# Patient Record
Sex: MALE | Race: WHITE | NOT HISPANIC OR LATINO | Employment: OTHER | URBAN - METROPOLITAN AREA
[De-identification: names, ages, dates, MRNs, and addresses within clinical notes are randomized per-mention and may not be internally consistent; named-entity substitution may affect disease eponyms.]

---

## 2017-08-16 ENCOUNTER — APPOINTMENT (OUTPATIENT)
Dept: LAB | Facility: MEDICAL CENTER | Age: 77
End: 2017-08-16

## 2017-08-16 ENCOUNTER — HOSPITAL ENCOUNTER (OUTPATIENT)
Dept: LAB | Facility: MEDICAL CENTER | Age: 77
End: 2017-08-16
Attending: INTERNAL MEDICINE
Payer: COMMERCIAL

## 2017-08-17 ENCOUNTER — HOSPITAL ENCOUNTER (OUTPATIENT)
Dept: HOSPITAL 8 - LAB | Age: 77
Discharge: HOME | End: 2017-08-17
Attending: INTERNAL MEDICINE
Payer: COMMERCIAL

## 2017-08-17 DIAGNOSIS — I25.10: ICD-10-CM

## 2017-08-17 DIAGNOSIS — E78.4: Primary | ICD-10-CM

## 2017-08-17 DIAGNOSIS — E11.9: ICD-10-CM

## 2017-08-17 LAB
AST SERPL-CCNC: 20 U/L (ref 15–37)
BUN SERPL-MCNC: 18 MG/DL (ref 7–18)

## 2017-08-17 PROCEDURE — 36415 COLL VENOUS BLD VENIPUNCTURE: CPT

## 2017-08-17 PROCEDURE — 80061 LIPID PANEL: CPT

## 2017-08-17 PROCEDURE — 83036 HEMOGLOBIN GLYCOSYLATED A1C: CPT

## 2017-08-17 PROCEDURE — 80053 COMPREHEN METABOLIC PANEL: CPT

## 2017-12-01 ENCOUNTER — HOSPITAL ENCOUNTER (OUTPATIENT)
Dept: LAB | Facility: MEDICAL CENTER | Age: 77
End: 2017-12-01
Attending: INTERNAL MEDICINE
Payer: COMMERCIAL

## 2017-12-01 LAB
ALBUMIN SERPL BCP-MCNC: 4.4 G/DL (ref 3.2–4.9)
ALP SERPL-CCNC: 53 U/L (ref 30–99)
ALT SERPL-CCNC: 21 U/L (ref 2–50)
AST SERPL-CCNC: 22 U/L (ref 12–45)
BILIRUB CONJ SERPL-MCNC: 0.1 MG/DL (ref 0.1–0.5)
BILIRUB INDIRECT SERPL-MCNC: 0.7 MG/DL (ref 0–1)
BILIRUB SERPL-MCNC: 0.8 MG/DL (ref 0.1–1.5)
CHOLEST SERPL-MCNC: 180 MG/DL (ref 100–199)
HDLC SERPL-MCNC: 34 MG/DL
LDLC SERPL CALC-MCNC: 76 MG/DL
PROT SERPL-MCNC: 7.2 G/DL (ref 6–8.2)
TRIGL SERPL-MCNC: 351 MG/DL (ref 0–149)

## 2017-12-01 PROCEDURE — 36415 COLL VENOUS BLD VENIPUNCTURE: CPT

## 2017-12-01 PROCEDURE — 80061 LIPID PANEL: CPT

## 2017-12-01 PROCEDURE — 80076 HEPATIC FUNCTION PANEL: CPT

## 2018-03-04 ENCOUNTER — HOSPITAL ENCOUNTER (OUTPATIENT)
Dept: HOSPITAL 8 - LAB | Age: 78
Discharge: HOME | End: 2018-03-04
Attending: INTERNAL MEDICINE
Payer: COMMERCIAL

## 2018-03-04 DIAGNOSIS — E78.4: Primary | ICD-10-CM

## 2018-03-04 DIAGNOSIS — Z95.1: ICD-10-CM

## 2018-03-04 LAB
ALBUMIN SERPL-MCNC: 4.1 G/DL (ref 3.4–5)
ALP SERPL-CCNC: 56 U/L (ref 45–117)
ALT SERPL-CCNC: 33 U/L (ref 12–78)
BILIRUB SERPL-MCNC: 1 MG/DL (ref 0.2–1)
CHOL/HDL RATIO: 4.4
HDL CHOL %: 23 % (ref 26–37)
HDL CHOLESTEROL (DIRECT): 33 MG/DL (ref 40–60)
LDL CHOLESTEROL,CALCULATED: 54 MG/DL (ref 54–169)
LDLC/HDLC SERPL: 1.6 {RATIO} (ref 0.5–3)
PROT SERPL-MCNC: 7.6 G/DL (ref 6.4–8.2)
TRIGL SERPL-MCNC: 285 MG/DL (ref 50–200)
VLDLC SERPL CALC-MCNC: 57 MG/DL (ref 0–25)

## 2018-03-04 PROCEDURE — 36415 COLL VENOUS BLD VENIPUNCTURE: CPT

## 2018-03-04 PROCEDURE — 80061 LIPID PANEL: CPT

## 2018-03-04 PROCEDURE — 80076 HEPATIC FUNCTION PANEL: CPT

## 2018-04-10 ENCOUNTER — HOSPITAL ENCOUNTER (OUTPATIENT)
Dept: HOSPITAL 8 - LAB | Age: 78
Discharge: HOME | End: 2018-04-10
Attending: INTERNAL MEDICINE
Payer: COMMERCIAL

## 2018-04-10 DIAGNOSIS — E78.4: Primary | ICD-10-CM

## 2018-04-10 LAB
ALBUMIN SERPL-MCNC: 3.9 G/DL (ref 3.4–5)
ALP SERPL-CCNC: 47 U/L (ref 45–117)
ALT SERPL-CCNC: 28 U/L (ref 12–78)
BILIRUB SERPL-MCNC: 0.7 MG/DL (ref 0.2–1)
CHOL/HDL RATIO: 5
HDL CHOL %: 20 % (ref 26–37)
HDL CHOLESTEROL (DIRECT): 31 MG/DL (ref 40–60)
LDL CHOLESTEROL,CALCULATED: 72 MG/DL (ref 54–169)
LDLC/HDLC SERPL: 2.3 {RATIO} (ref 0.5–3)
PROT SERPL-MCNC: 7.6 G/DL (ref 6.4–8.2)
TRIGL SERPL-MCNC: 261 MG/DL (ref 50–200)
VLDLC SERPL CALC-MCNC: 52 MG/DL (ref 0–25)

## 2018-04-10 PROCEDURE — 80076 HEPATIC FUNCTION PANEL: CPT

## 2018-04-10 PROCEDURE — 80061 LIPID PANEL: CPT

## 2018-04-10 PROCEDURE — 36415 COLL VENOUS BLD VENIPUNCTURE: CPT

## 2018-12-19 ENCOUNTER — HOSPITAL ENCOUNTER (OUTPATIENT)
Dept: RADIOLOGY | Facility: MEDICAL CENTER | Age: 78
End: 2018-12-19

## 2018-12-20 ENCOUNTER — HOSPITAL ENCOUNTER (OUTPATIENT)
Dept: RADIATION ONCOLOGY | Facility: MEDICAL CENTER | Age: 78
End: 2018-12-20

## 2018-12-20 ENCOUNTER — HOSPITAL ENCOUNTER (OUTPATIENT)
Dept: RADIATION ONCOLOGY | Facility: MEDICAL CENTER | Age: 78
End: 2018-12-31
Attending: RADIOLOGY
Payer: COMMERCIAL

## 2018-12-20 ENCOUNTER — HOSPITAL ENCOUNTER (OUTPATIENT)
Dept: RADIOLOGY | Facility: MEDICAL CENTER | Age: 78
End: 2018-12-20

## 2018-12-20 VITALS
OXYGEN SATURATION: 94 % | WEIGHT: 168.9 LBS | BODY MASS INDEX: 27.26 KG/M2 | SYSTOLIC BLOOD PRESSURE: 125 MMHG | DIASTOLIC BLOOD PRESSURE: 68 MMHG | TEMPERATURE: 96.8 F | RESPIRATION RATE: 16 BRPM | HEART RATE: 63 BPM

## 2018-12-20 PROCEDURE — 99205 OFFICE O/P NEW HI 60 MIN: CPT | Mod: 25 | Performed by: RADIOLOGY

## 2018-12-20 PROCEDURE — 77263 THER RADIOLOGY TX PLNG CPLX: CPT | Performed by: RADIOLOGY

## 2018-12-20 PROCEDURE — 77470 SPECIAL RADIATION TREATMENT: CPT | Mod: 26 | Performed by: RADIOLOGY

## 2018-12-20 PROCEDURE — 77290 THER RAD SIMULAJ FIELD CPLX: CPT | Performed by: RADIOLOGY

## 2018-12-20 PROCEDURE — 77290 THER RAD SIMULAJ FIELD CPLX: CPT | Mod: 26 | Performed by: RADIOLOGY

## 2018-12-20 PROCEDURE — 99214 OFFICE O/P EST MOD 30 MIN: CPT | Mod: 25 | Performed by: RADIOLOGY

## 2018-12-20 PROCEDURE — 77334 RADIATION TREATMENT AID(S): CPT | Mod: 26 | Performed by: RADIOLOGY

## 2018-12-20 PROCEDURE — 77470 SPECIAL RADIATION TREATMENT: CPT | Performed by: RADIOLOGY

## 2018-12-20 PROCEDURE — 77334 RADIATION TREATMENT AID(S): CPT | Performed by: RADIOLOGY

## 2018-12-20 ASSESSMENT — PAIN SCALES - GENERAL: PAINLEVEL: 6=MODERATE PAIN

## 2018-12-20 NOTE — NON-PROVIDER
Patient was seen today in clinic with Dr. King for Consult.  Vitals signs and weight were obtained and pain assessment was completed.  Allergies and medications were reviewed with the patient.  Review of systems completed    Vitals/Pain:  Vitals:    12/20/18 1025   BP: 125/68   BP Location: Right arm   Patient Position: Sitting   BP Cuff Size: Adult   Pulse: 63   Resp: 16   Temp: 36 °C (96.8 °F)   SpO2: 94%   Weight: 76.6 kg (168 lb 14.4 oz)   Pain Score: 6=Moderate Pain        Allergies:   Penicillins    Current Medications:  Current Outpatient Prescriptions   Medication Sig Dispense Refill   • metoprolol SR (TOPROL XL) 100 MG TB24 Take 1 Tab by mouth every day. Needs to be seen for further refills. Thank you 90 Tab 0   • gemfibrozil (LOPID) 600 MG TABS Take 1 Tab by mouth 2 times a day. 1 Tab 1   • rosuvastatin (CRESTOR) 10 MG TABS Take 1 Tab by mouth every evening. 1 Tab 1   • nitroglycerin (NITROSTAT) 0.4 MG SUBL Place 1 Tab under tongue as needed for Chest Pain (every 5 minutes up to 3 doses in 15 minutes.). 25 Tab 2   • finasteride (PROSCAR) 5 MG TABS Take 5 mg by mouth every day.     • methocarbamol (ROBAXIN) 500 MG TABS Take 500 mg by mouth 2 Times a Day.     • pantoprazole (PROTONIX) 40 MG TBEC Take 1 Tab by mouth every day. Indications: Gastroesophageal Reflux Disease 90 Tab 3   • lisinopril (PRINIVIL) 5 MG TABS Take 1 Tab by mouth every day. 90 Tab 3   • clopidogrel (PLAVIX) 75 MG TABS Take 1 Tab by mouth every day. 90 Tab 3   • aspirin (ASA) 325 MG TABS Take 325 mg by mouth every day.     • Misc Natural Products (GLUCOSAMINE CHOND COMPLEX/MSM PO) Take 2 Tabs by mouth every day.     • Multiple Vitamins-Minerals (MULTIVITAMIN PO) Take 1 Tab by mouth every day.     • hydrocodone-acetaminophen (VICODIN) 5-500 MG TABS Take 1-2 Tabs by mouth every four hours as needed.     • vitamin D (CHOLECALCIFEROL) 1000 UNIT TABS Take 1,000 Units by mouth every day.       No current facility-administered medications  for this encounter.          PCP:  Flora Bedolla, Med Ass't

## 2018-12-20 NOTE — CONSULTS
RADIATION ONCOLOGY CONSULT    DATE OF SERVICE: 12/20/2018    IDENTIFICATION:   Oligo metastatic prostate cancer, PSA 12.32, Asher score 4+4 = 8 involvement of 12 of 12 core biopsies, T7 vertebral body lesion by bone scan CAT scan and MRI, concerning pleural-based lung nodules of unknown significance, initiated on Lupron December 2018    HISTORY OF PRESENT ILLNESS: I had the pleasure of seeing Mr. Flores today in consultation at the request of Dr. Bryan for his prostate cancer.  Patient is a 78-year-old gentleman who was appreciated to have a precipitous rise in his PSA.  He is run a chronically elevated PSA and in July 2018 was 9.15 but with repeat in November had risen to 12.32.  Therefore transrectal ultrasound with biopsy was recommended.  Ultimately all 12 cores were involved with varying Dalton scores of malignancy.  The highest component being Asher 4+4 = 8 disease in the 3 quadrants.  The remainder of the disease was either Asher 3+4 = 7 or 4+3 = 7.  As a part of his workup CT scan of the chest abdomen and pelvis and bone scan.  Showed uptake in the T7 and questionably in the L5 by bone scan.  CT scan and ultimately MRI of the lumbar spine did not show any metastatic disease in the L5 but did show CAT scan and MRI evidence of metastatic disease in the T7 lesion.  In addition the lungs showed several pulmonary nodules that are pleural-based.  None greater than 5 mm in size.  There was questionable mediastinal lymphadenopathy.  This was biopsied and found to be negative.  A prior CT scan of the chest it showed groundglass opacifications that on this most recent CT scan cleared with no intervention.  Therefore at minimum he has oligo metastatic disease with a high-grade lesion in the prostate with a T7 vertebral body metastasis.  He has concerning findings on the CT scan of the chest that will need follow-up.  He was started on Lupron in December.  He presents today for consideration of radiotherapy as a  part of his management schema.  He was seen in medical oncology who did not feel any further medical oncology intervention was required.    PAST MEDICAL HISTORY:   Past Medical History:   Diagnosis Date   • CAD (coronary artery disease)    • Chronic airway obstruction, not elsewhere classified    • Coronary atherosclerosis of unspecified type of vessel, native or graft    • Diverticulitis    • GERD (gastroesophageal reflux disease)    • Gout    • Hypertension    • Other and unspecified hyperlipidemia    • Prostate cancer, primary, with metastasis from prostate to other site (HCC)    • Pulmonary nodule    • Thrombocytopenia (HCC)    • Type II or unspecified type diabetes mellitus without mention of complication, not stated as uncontrolled        PAST SURGICAL HISTORY:  Past Surgical History:   Procedure Laterality Date   • PROSTATE NEEDLE BIOPSY  09/18/2018   • OTHER      lumbar surgery    • OTHER      cervical surgery       CURRENT MEDICATIONS:  Current Outpatient Prescriptions   Medication Sig Dispense Refill   • metoprolol SR (TOPROL XL) 100 MG TB24 Take 1 Tab by mouth every day. Needs to be seen for further refills. Thank you 90 Tab 0   • gemfibrozil (LOPID) 600 MG TABS Take 1 Tab by mouth 2 times a day. 1 Tab 1   • rosuvastatin (CRESTOR) 10 MG TABS Take 1 Tab by mouth every evening. 1 Tab 1   • nitroglycerin (NITROSTAT) 0.4 MG SUBL Place 1 Tab under tongue as needed for Chest Pain (every 5 minutes up to 3 doses in 15 minutes.). 25 Tab 2   • finasteride (PROSCAR) 5 MG TABS Take 5 mg by mouth every day.     • methocarbamol (ROBAXIN) 500 MG TABS Take 500 mg by mouth 2 Times a Day.     • pantoprazole (PROTONIX) 40 MG TBEC Take 1 Tab by mouth every day. Indications: Gastroesophageal Reflux Disease 90 Tab 3   • lisinopril (PRINIVIL) 5 MG TABS Take 1 Tab by mouth every day. 90 Tab 3   • clopidogrel (PLAVIX) 75 MG TABS Take 1 Tab by mouth every day. 90 Tab 3   • aspirin (ASA) 325 MG TABS Take 325 mg by mouth every day.   "   • Misc Natural Products (GLUCOSAMINE CHOND COMPLEX/MSM PO) Take 2 Tabs by mouth every day.     • Multiple Vitamins-Minerals (MULTIVITAMIN PO) Take 1 Tab by mouth every day.     • hydrocodone-acetaminophen (VICODIN) 5-500 MG TABS Take 1-2 Tabs by mouth every four hours as needed.     • vitamin D (CHOLECALCIFEROL) 1000 UNIT TABS Take 1,000 Units by mouth every day.       No current facility-administered medications for this encounter.        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    REVIEW OF SYSTEMS:  A complete review of systems was completed in patient's chart on 12/20/2018.  All are negative with relationship to this diagnosis with the exception of:  Patient states he has had chronic back pain but this is unchanged, he has a brother who has had multiple malignancies due to agent orange exposure and has required radiation in the past, this does influence his treatment decisions somewhat, he denies any neurologic deficits, he has pre-existing urgency and frequency of urination    PHYSICAL EXAM:    Vitals:    12/20/18 1025   BP: 125/68   BP Location: Right arm   Patient Position: Sitting   BP Cuff Size: Adult   Pulse: 63   Resp: 16   Temp: 36 °C (96.8 °F)   SpO2: 94%   Weight: 76.6 kg (168 lb 14.4 oz)   Pain Score: 6=Moderate Pain      0= Fully active, able to carry on all pre-disease performance without restriction.    Pain Scale: 0-10  Pain Assessement: 6/10  Pain Location, Orientation and Scale: \"all over\"  What makes the pain better: pt " reported to medical assistant that he drinks alcohol in coffee to feel better  What makes the pain worse: nothing  GENERAL: No apparent distress.  HEENT:  Pupils are equal, round, and reactive to light.  Extraocular muscles   are intact. Sclerae nonicteric.  Conjunctivae pink.  Oral cavity, tongue   protrudes midline.   NECK:  Supple without evidence of thyromegaly.  NODES:  No peripheral adenopathy of the neck, supraclavicular fossa or axillae   bilaterally.  LUNGS:  Clear to ascultation bilaterally   HEART:  Regular rate and rhythm.  No murmur appreciated  ABDOMEN:  Soft. No evidence of hepatosplenomegaly.  Positive bowel sounds.  RECTAL: No suspicious nodules or lesions  EXTREMITIES:  Without Edema.  NEUROLOGIC:  Cranial nerves II through XII were intact. Normal stance and gait motor and sensory grossly within normal limits  IMPRESSION:   Oligo metastatic prostate cancer, PSA 12.32, [place] score 4+4 = 8 involvement of 12 of 12 core biopsies, T7 vertebral body lesion by bone scan CAT scan and MRI, concerning pleural-based lung nodules of unknown significance, initiated on Lupron December 2018       RECOMMENDATIONS:   I discussed the diagnosis, prognosis, and treatment options over a 1 hr 5 min time period, 95% of that time dedicated to ongoing treatment management.  I reviewed the imaging with the patient.  All of his imaging is consistent with metastatic process in the T7 vertebral body.  There is no CT or MRI correlate in the L5 for metastasis.  The lungs show some radiographic abnormality.  Previous groundglass opacifications have resolved spontaneously but now has pleural-based tiny nodules as well as adenopathy.  The adenopathy is biopsy negative.  These lesions are too small for needle biopsy.  Therefore I recommended follow-up in 3 months with CT scan of the chest.  At this point I am willing to give him the benefit of the doubt that the lung is not attributable to his prostate cancer given his other  variables.  Therefore I would stage him as a oligo metastatic.  He has initiated his systemic component of treatment with Lupron injection.  Our plans are to start with a radiosurgical treatment to the T-spine.  This will entail a single fraction of radiosurgery.  We will restage his lung in 3 months.  If no overt metastatic disease is identified then we will proceed with local prostate treatment.  This will consist of 40 fractions of external beam radiotherapy directed at the prostate.  He will continue his androgen deprivation over a 2-3-year time.    We discussed the risks, benefits and side effects of treatment and the patient is amenable to treatment.  If patient has any questions or concerns, he should feel free to contact me.    Thank you for the opportunity to participate in his care.  If any questions or comments, please do not hesitate in calling.

## 2018-12-26 ENCOUNTER — HOSPITAL ENCOUNTER (OUTPATIENT)
Dept: RADIOLOGY | Facility: MEDICAL CENTER | Age: 78
End: 2018-12-26

## 2018-12-28 ENCOUNTER — PATIENT OUTREACH (OUTPATIENT)
Dept: OTHER | Facility: MEDICAL CENTER | Age: 78
End: 2018-12-28

## 2018-12-28 NOTE — PROGRESS NOTES
Telephone call to pt.  Introduced myself and the NN role and provided contact information. Support & services offered.  No needs verbalized at this time. Reminded pt of upcoming radiation treatment appointment.

## 2019-01-02 ENCOUNTER — HOSPITAL ENCOUNTER (OUTPATIENT)
Dept: RADIATION ONCOLOGY | Facility: MEDICAL CENTER | Age: 79
End: 2019-01-31
Attending: RADIOLOGY
Payer: COMMERCIAL

## 2019-01-02 PROCEDURE — 77334 RADIATION TREATMENT AID(S): CPT | Performed by: RADIOLOGY

## 2019-01-02 PROCEDURE — 77334 RADIATION TREATMENT AID(S): CPT | Mod: 26 | Performed by: RADIOLOGY

## 2019-01-02 PROCEDURE — 77295 3-D RADIOTHERAPY PLAN: CPT | Performed by: RADIOLOGY

## 2019-01-02 PROCEDURE — 77300 RADIATION THERAPY DOSE PLAN: CPT | Mod: 26 | Performed by: RADIOLOGY

## 2019-01-02 PROCEDURE — 77300 RADIATION THERAPY DOSE PLAN: CPT | Performed by: RADIOLOGY

## 2019-01-02 PROCEDURE — 77295 3-D RADIOTHERAPY PLAN: CPT | Mod: 26 | Performed by: RADIOLOGY

## 2019-01-03 ENCOUNTER — HOSPITAL ENCOUNTER (OUTPATIENT)
Dept: RADIATION ONCOLOGY | Facility: MEDICAL CENTER | Age: 79
End: 2019-01-03

## 2019-01-03 PROCEDURE — 77370 RADIATION PHYSICS CONSULT: CPT | Mod: XU | Performed by: RADIOLOGY

## 2019-01-03 PROCEDURE — 77435 SBRT MANAGEMENT: CPT | Performed by: RADIOLOGY

## 2019-01-03 PROCEDURE — 77280 THER RAD SIMULAJ FIELD SMPL: CPT | Mod: 26 | Performed by: RADIOLOGY

## 2019-01-03 PROCEDURE — 77280 THER RAD SIMULAJ FIELD SMPL: CPT | Performed by: RADIOLOGY

## 2019-01-03 PROCEDURE — 77336 RADIATION PHYSICS CONSULT: CPT | Performed by: RADIOLOGY

## 2019-01-03 PROCEDURE — 77373 STRTCTC BDY RAD THER TX DLVR: CPT | Performed by: RADIOLOGY

## 2019-02-14 ENCOUNTER — HOSPITAL ENCOUNTER (OUTPATIENT)
Dept: RADIATION ONCOLOGY | Facility: MEDICAL CENTER | Age: 79
End: 2019-02-28
Attending: RADIOLOGY
Payer: COMMERCIAL

## 2019-02-14 VITALS
SYSTOLIC BLOOD PRESSURE: 122 MMHG | HEIGHT: 66 IN | TEMPERATURE: 98 F | WEIGHT: 165.4 LBS | HEART RATE: 66 BPM | DIASTOLIC BLOOD PRESSURE: 68 MMHG | OXYGEN SATURATION: 97 % | BODY MASS INDEX: 26.58 KG/M2

## 2019-02-14 PROCEDURE — 99212 OFFICE O/P EST SF 10 MIN: CPT | Performed by: RADIOLOGY

## 2019-02-14 ASSESSMENT — PAIN SCALES - GENERAL: PAINLEVEL: NO PAIN

## 2019-02-14 NOTE — PROGRESS NOTES
"RADIATION ONCOLOGY FOLLOW UP    DATE OF SERVICE: 2/14/2019    IDENTIFICATION:   Oligo metastatic prostate cancer, PSA 12.32, Asher score 4+4 = 8 involvement of 12 of 12 core biopsies, T7 vertebral body lesion by bone scan CAT scan and MRI, concerning pleural-based lung nodules of unknown significance, initiated on Lupron December 2018 treated with stereotactic body radiosurgery to the T7 vertebral body to 14 Gy in a single fraction in January 2019    INTERVAL HISTORY: I had the pleasure of seeing Mr. Flores today in follow up for his prostate cancer.  Patient states from a symptom standpoint his midthoracic back is feeling quite well.  He has only minimal discomfort in this area.  He has chronic low back pain in the lumbar spine which is unchanged but still present.  He had a PSA at the [facility] that 0.26 showing excellent biochemical initial response.  He has not yet had his restaging CT scan of the chest to assess the pleural-based lung nodules.  He feels he is tolerating his Lupron well without any significant untoward effects.    PHYSICAL EXAM:    Vitals:    02/14/19 0755   BP: 122/68   BP Location: Right arm   Patient Position: Sitting   BP Cuff Size: Adult   Pulse: 66   Temp: 36.7 °C (98 °F)   TempSrc: Temporal   SpO2: 97%   Weight: 75 kg (165 lb 6.4 oz)   Height: 1.676 m (5' 6\")   Pain Score: No pain      0= Fully active, able to carry on all pre-disease performance without restriction.        GENERAL: No apparent distress.  HEENT:  Pupils are equal, round, and reactive to light.  Extraocular muscles   are intact. Sclerae nonicteric.  Conjunctivae pink.  Oral cavity, tongue   protrudes midline.   NECK:  Supple without evidence of thyromegaly.  NODES:  No peripheral adenopathy of the neck, supraclavicular fossa or axillae   bilaterally.  LUNGS:  Clear to ascultation bilaterally   HEART:  Regular rate and rhythm.  No murmur appreciated  ABDOMEN:  Soft. No evidence of hepatosplenomegaly.  Positive bowel " sounds.  RECTAL: No suspicious nodules or lesions  EXTREMITIES:  Without Edema.  NEUROLOGIC:  Cranial nerves II through XII were intact. Normal stance and gait motor and sensory grossly within normal limits    IMPRESSION:   Oligo metastatic prostate cancer, PSA 12.32, Asher score 4+4 = 8 involvement of 12 of 12 core biopsies, T7 vertebral body lesion by bone scan CAT scan and MRI, concerning pleural-based lung nodules of unknown significance, initiated on Lupron December 2018 treated with stereotactic body radiosurgery to the T7 vertebral body to 14 Gy in a single fraction in January 2019    RECOMMENDATIONS:   I discussed the patient his findings over a 35 minute time period, 95% of that time dedicated to an ongoing survivorship plan.  At our initial consultation we discussed the possibility of prostate guided radiotherapy if he continues to do well.  We discussed making this decision after his repeat CT scan at the 3-month toshia.  Today we rediscussed the pros and cons of prostate guided radiotherapy in metastatic prostate cancer.  Patient does have a brother who had received radiotherapy to his pelvis in the past and had significant bowel injury requiring colostomy.  He states this does weigh heavily on his decision-making.  Currently he is asymptomatic with a good biochemical control so remains uncertain whether he wishes to pursue local therapy.  At initial consultation he was more interested in considering this but with good biochemical feedback by his PSA he is giving this more serious thought.  We discussed traditional external beam radiotherapy to 40 fractions.  Patient does not feel he wishes to take that much time out given his diagnosis but would consider stereotactic body radiosurgery to 5 fractions given on an every other day basis.  We discussed the data for radiosurgery not exactly being in this circumstance but understand his rationale.  Patient is going to have his CT scan discussed this with   Randi at the [facility] and make his final determination sometime next month.        If patient has any questions or concerns, he should feel free to contact me.

## 2019-02-14 NOTE — NON-PROVIDER
"Patient was seen today in clinic with Dr. King for Follow up.  Vitals signs and weight were obtained and pain assessment was completed.  Allergies and medications were reviewed with the patient.  Toxicities of treatment assessed.     Vitals/Pain:  Vitals:    02/14/19 0755   BP: 122/68   BP Location: Right arm   Patient Position: Sitting   BP Cuff Size: Adult   Pulse: 66   Temp: 36.7 °C (98 °F)   TempSrc: Temporal   SpO2: 97%   Weight: 75 kg (165 lb 6.4 oz)   Height: 1.676 m (5' 6\")   Pain Score: No pain        Allergies:   Penicillins    Current Medications:  Current Outpatient Prescriptions   Medication Sig Dispense Refill   • metoprolol SR (TOPROL XL) 100 MG TB24 Take 1 Tab by mouth every day. Needs to be seen for further refills. Thank you 90 Tab 0   • gemfibrozil (LOPID) 600 MG TABS Take 1 Tab by mouth 2 times a day. 1 Tab 1   • rosuvastatin (CRESTOR) 10 MG TABS Take 1 Tab by mouth every evening. 1 Tab 1   • nitroglycerin (NITROSTAT) 0.4 MG SUBL Place 1 Tab under tongue as needed for Chest Pain (every 5 minutes up to 3 doses in 15 minutes.). 25 Tab 2   • finasteride (PROSCAR) 5 MG TABS Take 5 mg by mouth every day.     • methocarbamol (ROBAXIN) 500 MG TABS Take 500 mg by mouth 2 Times a Day.     • pantoprazole (PROTONIX) 40 MG TBEC Take 1 Tab by mouth every day. Indications: Gastroesophageal Reflux Disease 90 Tab 3   • lisinopril (PRINIVIL) 5 MG TABS Take 1 Tab by mouth every day. 90 Tab 3   • clopidogrel (PLAVIX) 75 MG TABS Take 1 Tab by mouth every day. 90 Tab 3   • aspirin (ASA) 325 MG TABS Take 325 mg by mouth every day.     • Misc Natural Products (GLUCOSAMINE CHOND COMPLEX/MSM PO) Take 2 Tabs by mouth every day.     • Multiple Vitamins-Minerals (MULTIVITAMIN PO) Take 1 Tab by mouth every day.     • hydrocodone-acetaminophen (VICODIN) 5-500 MG TABS Take 1-2 Tabs by mouth every four hours as needed.     • vitamin D (CHOLECALCIFEROL) 1000 UNIT TABS Take 1,000 Units by mouth every day.       No current " facility-administered medications for this encounter.          PCP:  Flora Torres, Med Ass't

## 2019-03-11 ENCOUNTER — HOSPITAL ENCOUNTER (OUTPATIENT)
Dept: RADIATION ONCOLOGY | Facility: MEDICAL CENTER | Age: 79
End: 2019-03-31
Attending: RADIOLOGY
Payer: COMMERCIAL

## 2019-03-11 VITALS
HEART RATE: 57 BPM | WEIGHT: 165 LBS | BODY MASS INDEX: 26.63 KG/M2 | SYSTOLIC BLOOD PRESSURE: 135 MMHG | OXYGEN SATURATION: 97 % | DIASTOLIC BLOOD PRESSURE: 60 MMHG | TEMPERATURE: 97.1 F

## 2019-03-11 PROCEDURE — 99212 OFFICE O/P EST SF 10 MIN: CPT | Performed by: RADIOLOGY

## 2019-03-11 ASSESSMENT — PAIN SCALES - GENERAL: PAINLEVEL: 7=MODERATE-SEVERE PAIN

## 2019-03-11 NOTE — PROGRESS NOTES
RADIATION ONCOLOGY FOLLOW UP    DATE OF SERVICE: 3/11/2019    IDENTIFICATION:   Oligo metastatic prostate cancer, PSA 12.32, [place] score 4+4 = 8 involvement of 12 of 12 core biopsies, T7 vertebral body lesion by bone scan CAT scan and MRI, concerning pleural-based lung nodules of unknown significance, initiated on Lupron December 2018 treated with stereotactic body radiosurgery to the T7 vertebral body to 14 Gy in a single fraction in January 2019    INTERVAL HISTORY: I had the pleasure of seeing Mr. Flores today in follow up for his prostate cancer.  We initially treated patient to his T7 vertebral body metastasis.  He was being followed for lung lesions of unknown known significance.  These have now been followed up and felt to be benign in nature.  Patient has had a repeat PSA that is 0.26.  He [facility]  He presents back today to further discuss this option.  Of note patient is conflicted as he does wish to pursue the definitive treatment but has a brother who had negative bowel consequences from rectal cancer radiotherapy.  He is looking for further information to better balance quality versus quantity of life goals.  He states he does have longevity in his family and is otherwise quite healthy.    PHYSICAL EXAM:    Vitals:    03/11/19 1130   BP: 135/60   Pulse: (!) 57   Temp: 36.2 °C (97.1 °F)   SpO2: 97%   Weight: 74.8 kg (165 lb)   Pain Score: 7=Moderate-Severe Pain (lower back pain )      1= Restricted in physically strenuous activity, but ambulatory and able to carry out work of a light sedentary nature, e.g., light housework, office work.        GENERAL: No apparent distress.  HEENT:  Pupils are equal, round, and reactive to light.  Extraocular muscles   are intact. Sclerae nonicteric.  Conjunctivae pink.  Oral cavity, tongue   protrudes midline.   NECK:  Supple without evidence of thyromegaly.  NODES:  No peripheral  adenopathy of the neck, supraclavicular fossa or axillae   bilaterally.  LUNGS:  Clear to ascultation bilaterally   HEART:  Regular rate and rhythm.  No murmur appreciated  ABDOMEN:  Soft. No evidence of hepatosplenomegaly.  Positive bowel sounds.  EXTREMITIES:  Without Edema.  NEUROLOGIC:  Cranial nerves II through XII were intact. Normal stance and gait motor and sensory grossly within normal limits        IPSS:  IN THE PAST MONTH:     1.  Incomplete Emptying: How often have you had the sensation of not emptying your bladder?  0 = Not at all    2.  Frequency: How often have you had to urinate less than every two hours? 1 = Less that 1 in 5 times    3.  Intermittency: How often have you found you stopped and started again several times when you urinated? 0 = Not at all    4.  Urgency: How often have you found it difficult to postpone urination? 3 = About half the time    5.  Weak Stream: How often have you had a weak urinary stream? 2 = Less than half the time    6.  Straining: How often have you had to strain to start urination? 0 = Not at all    7.  Nocturia: How many times did you typically get up at night to urinate? 1 = 1 time    TOTAL: 7 1-7 = Mild    QUALITY OF LIFE DUE TO URINARY SYMPTOMS:     If you were to spend the rest of your life with your urinary condition just the way it is now, how would you feel about that? 2  = Mostly satisfied      BISI:  SEXUAL HEALTH INVENTORY FOR MEN (BISI):   Over the past 6 months:   1.  How do you rate your confidence that you could get and keep an erection?  1 = Very low    2.  When you had erections with sexual stimulation, how often were your erections hard enough for penetration (entering your partner)? 0 = No sexual activitity     3.  During sexual intercourse, how often were you able to maintain your erection after you had penetrated (entered) your partner? 0 = Did not attempt    4.  During sexual intercourse, how difficult was it to maintain your erection to  completion of intercourse? 0= Did not attempt intercourse    5.  When you attempted sexual intercourse, how often was it satisfactory for you? 0 = Did not attempt intercourse    TOTAL: 1    The Sexual Health Inventory for Men further classifies ED severity with the following breakpoints: 1-7 = Severe ED    IMPRESSION:   Oligo metastatic prostate cancer, PSA 12.32, [place] score 4+4 = 8 involvement of 12 of 12 core biopsies, T7 vertebral body lesion by bone scan CAT scan and MRI, concerning pleural-based lung nodules of unknown significance, initiated on Lupron December 2018 treated with stereotactic body radiosurgery to the T7 vertebral body to 14 Gy in a single fraction in January 2019    RECOMMENDATIONS:   I discussed the diagnosis, prognosis, and treatment options over a 45 min time period, 95% of that time dedicated to ongoing treatment management.  Patient was able to have his brother join us by speaker phone for this discussion.  Again of note patient is very influenced by his brothers negative bowel impact after receiving radiotherapy for rectal cancer.  However as a goal of therapy he is looking for curative intent treatment.  He currently has oligo metastatic disease with a single vertebral body site.  Therefore we reexplored for his brother the data surrounding this clinical scenario and the role of primary site radiotherapy.  The options we discussed where continuing on his androgen deprivation therapy with no radiation, delayed radiotherapy, or primary site radiotherapy at this time.  Previously we had already discussed he would only be interested in prostate only treatment does not wish to have pelvic lymph nodes treated given his brother's experience.  Ultimately patient and his brother decided it would be in his best interest to pursue radiotherapy with conventional fractionated radiation.  However he would like to start this in early June after an upcoming trip to [place].  He was given a simulation  time for Ce 3 at 9 AM.      If patient has any questions or concerns, he should feel free to contact me.

## 2019-03-11 NOTE — NON-PROVIDER
Patient was seen today in clinic with Dr. King for Follow up.  Vitals signs and weight were obtained and pain assessment was completed.  Allergies and medications were reviewed with the patient.  Toxicities of treatment assessed.     Vitals/Pain:  Vitals:    03/11/19 1130   BP: 135/60   Pulse: (!) 57   Temp: 36.2 °C (97.1 °F)   SpO2: 97%   Weight: 74.8 kg (165 lb)   Pain Score: 7=Moderate-Severe Pain (lower back pain )        Allergies:   Penicillins    Current Medications:  Current Outpatient Prescriptions   Medication Sig Dispense Refill   • metoprolol SR (TOPROL XL) 100 MG TB24 Take 1 Tab by mouth every day. Needs to be seen for further refills. Thank you 90 Tab 0   • gemfibrozil (LOPID) 600 MG TABS Take 1 Tab by mouth 2 times a day. 1 Tab 1   • rosuvastatin (CRESTOR) 10 MG TABS Take 1 Tab by mouth every evening. 1 Tab 1   • nitroglycerin (NITROSTAT) 0.4 MG SUBL Place 1 Tab under tongue as needed for Chest Pain (every 5 minutes up to 3 doses in 15 minutes.). 25 Tab 2   • finasteride (PROSCAR) 5 MG TABS Take 5 mg by mouth every day.     • methocarbamol (ROBAXIN) 500 MG TABS Take 500 mg by mouth 2 Times a Day.     • pantoprazole (PROTONIX) 40 MG TBEC Take 1 Tab by mouth every day. Indications: Gastroesophageal Reflux Disease 90 Tab 3   • lisinopril (PRINIVIL) 5 MG TABS Take 1 Tab by mouth every day. 90 Tab 3   • clopidogrel (PLAVIX) 75 MG TABS Take 1 Tab by mouth every day. 90 Tab 3   • aspirin (ASA) 325 MG TABS Take 325 mg by mouth every day.     • Misc Natural Products (GLUCOSAMINE CHOND COMPLEX/MSM PO) Take 2 Tabs by mouth every day.     • Multiple Vitamins-Minerals (MULTIVITAMIN PO) Take 1 Tab by mouth every day.     • hydrocodone-acetaminophen (VICODIN) 5-500 MG TABS Take 1-2 Tabs by mouth every four hours as needed.     • vitamin D (CHOLECALCIFEROL) 1000 UNIT TABS Take 1,000 Units by mouth every day.       No current facility-administered medications for this encounter.          PCP:  Flora Piña  MAGO Bedolla, Med Ass't

## 2019-06-03 ENCOUNTER — HOSPITAL ENCOUNTER (OUTPATIENT)
Dept: RADIATION ONCOLOGY | Facility: MEDICAL CENTER | Age: 79
End: 2019-06-03

## 2019-06-03 ENCOUNTER — HOSPITAL ENCOUNTER (OUTPATIENT)
Dept: RADIATION ONCOLOGY | Facility: MEDICAL CENTER | Age: 79
End: 2019-06-30
Attending: RADIOLOGY
Payer: COMMERCIAL

## 2019-06-03 PROCEDURE — 77334 RADIATION TREATMENT AID(S): CPT | Performed by: RADIOLOGY

## 2019-06-03 PROCEDURE — 77290 THER RAD SIMULAJ FIELD CPLX: CPT | Performed by: RADIOLOGY

## 2019-06-03 PROCEDURE — 77263 THER RADIOLOGY TX PLNG CPLX: CPT | Performed by: RADIOLOGY

## 2019-06-03 PROCEDURE — 77334 RADIATION TREATMENT AID(S): CPT | Mod: 26 | Performed by: RADIOLOGY

## 2019-06-05 PROCEDURE — 77338 DESIGN MLC DEVICE FOR IMRT: CPT | Mod: 26 | Performed by: RADIOLOGY

## 2019-06-05 PROCEDURE — 77301 RADIOTHERAPY DOSE PLAN IMRT: CPT | Performed by: RADIOLOGY

## 2019-06-05 PROCEDURE — 77300 RADIATION THERAPY DOSE PLAN: CPT | Mod: 26 | Performed by: RADIOLOGY

## 2019-06-05 PROCEDURE — 77301 RADIOTHERAPY DOSE PLAN IMRT: CPT | Mod: 26 | Performed by: RADIOLOGY

## 2019-06-05 PROCEDURE — 77300 RADIATION THERAPY DOSE PLAN: CPT | Performed by: RADIOLOGY

## 2019-06-05 PROCEDURE — 77338 DESIGN MLC DEVICE FOR IMRT: CPT | Performed by: RADIOLOGY

## 2019-06-10 ENCOUNTER — HOSPITAL ENCOUNTER (OUTPATIENT)
Dept: RADIATION ONCOLOGY | Facility: MEDICAL CENTER | Age: 79
End: 2019-06-10

## 2019-06-10 LAB
CHEMOTHERAPY INFUSION START DATE: NORMAL
CHEMOTHERAPY RECORDS: 2
CHEMOTHERAPY RECORDS: 5000
CHEMOTHERAPY RECORDS: NORMAL
CHEMOTHERAPY RX CANCER: NORMAL
RAD ONC ARIA COURSE TREATMENT ELAPSED DAYS: NORMAL
RAD ONC ARIA PLAN TREATMENT DATES: NORMAL
RAD ONC ARIA REFERENCE POINT DOSAGE GIVEN TO DATE: 2
RAD ONC ARIA REFERENCE POINT DOSAGE GIVEN TO DATE: 2.04
RAD ONC ARIA REFERENCE POINT ID: NORMAL
RAD ONC ARIA REFERENCE POINT ID: NORMAL
RAD ONC ARIA REFERENCE POINT SESSION DOSAGE GIVEN: 2
RAD ONC ARIA REFERENCE POINT SESSION DOSAGE GIVEN: 2.04

## 2019-06-10 PROCEDURE — 77385 HCHG IMRT DELIVERY SIMPLE: CPT | Performed by: RADIOLOGY

## 2019-06-10 PROCEDURE — 77280 THER RAD SIMULAJ FIELD SMPL: CPT | Performed by: RADIOLOGY

## 2019-06-11 ENCOUNTER — HOSPITAL ENCOUNTER (OUTPATIENT)
Dept: RADIATION ONCOLOGY | Facility: MEDICAL CENTER | Age: 79
End: 2019-06-11

## 2019-06-11 LAB
CHEMOTHERAPY INFUSION START DATE: NORMAL
CHEMOTHERAPY RECORDS: 2
CHEMOTHERAPY RECORDS: 5000
CHEMOTHERAPY RECORDS: NORMAL
CHEMOTHERAPY RX CANCER: NORMAL
RAD ONC ARIA COURSE TREATMENT ELAPSED DAYS: NORMAL
RAD ONC ARIA PLAN TREATMENT DATES: NORMAL
RAD ONC ARIA REFERENCE POINT DOSAGE GIVEN TO DATE: 4
RAD ONC ARIA REFERENCE POINT DOSAGE GIVEN TO DATE: 4.08
RAD ONC ARIA REFERENCE POINT ID: NORMAL
RAD ONC ARIA REFERENCE POINT ID: NORMAL
RAD ONC ARIA REFERENCE POINT SESSION DOSAGE GIVEN: 2
RAD ONC ARIA REFERENCE POINT SESSION DOSAGE GIVEN: 2.04

## 2019-06-11 PROCEDURE — 77014 PR CT GUIDANCE PLACEMENT RAD THERAPY FIELDS: CPT | Mod: 26 | Performed by: RADIOLOGY

## 2019-06-11 PROCEDURE — 77385 HCHG IMRT DELIVERY SIMPLE: CPT | Performed by: RADIOLOGY

## 2019-06-12 LAB
CHEMOTHERAPY INFUSION START DATE: NORMAL
CHEMOTHERAPY RECORDS: 2
CHEMOTHERAPY RECORDS: 5000
CHEMOTHERAPY RECORDS: NORMAL
CHEMOTHERAPY RX CANCER: NORMAL
RAD ONC ARIA COURSE TREATMENT ELAPSED DAYS: NORMAL
RAD ONC ARIA PLAN TREATMENT DATES: NORMAL
RAD ONC ARIA REFERENCE POINT DOSAGE GIVEN TO DATE: 6
RAD ONC ARIA REFERENCE POINT DOSAGE GIVEN TO DATE: 6.12
RAD ONC ARIA REFERENCE POINT ID: NORMAL
RAD ONC ARIA REFERENCE POINT ID: NORMAL
RAD ONC ARIA REFERENCE POINT SESSION DOSAGE GIVEN: 2
RAD ONC ARIA REFERENCE POINT SESSION DOSAGE GIVEN: 2.04

## 2019-06-12 PROCEDURE — 77385 HCHG IMRT DELIVERY SIMPLE: CPT | Performed by: RADIOLOGY

## 2019-06-12 PROCEDURE — 77014 PR CT GUIDANCE PLACEMENT RAD THERAPY FIELDS: CPT | Mod: 26 | Performed by: RADIOLOGY

## 2019-06-12 PROCEDURE — 77336 RADIATION PHYSICS CONSULT: CPT | Performed by: RADIOLOGY

## 2019-06-13 ENCOUNTER — HOSPITAL ENCOUNTER (OUTPATIENT)
Dept: RADIATION ONCOLOGY | Facility: MEDICAL CENTER | Age: 79
End: 2019-06-13

## 2019-06-13 LAB
CHEMOTHERAPY INFUSION START DATE: NORMAL
CHEMOTHERAPY RECORDS: 2
CHEMOTHERAPY RECORDS: 5000
CHEMOTHERAPY RECORDS: NORMAL
CHEMOTHERAPY RX CANCER: NORMAL
RAD ONC ARIA COURSE TREATMENT ELAPSED DAYS: NORMAL
RAD ONC ARIA PLAN TREATMENT DATES: NORMAL
RAD ONC ARIA REFERENCE POINT DOSAGE GIVEN TO DATE: 8
RAD ONC ARIA REFERENCE POINT DOSAGE GIVEN TO DATE: 8.16
RAD ONC ARIA REFERENCE POINT ID: NORMAL
RAD ONC ARIA REFERENCE POINT ID: NORMAL
RAD ONC ARIA REFERENCE POINT SESSION DOSAGE GIVEN: 2
RAD ONC ARIA REFERENCE POINT SESSION DOSAGE GIVEN: 2.04

## 2019-06-13 PROCEDURE — 77014 PR CT GUIDANCE PLACEMENT RAD THERAPY FIELDS: CPT | Mod: 26 | Performed by: RADIOLOGY

## 2019-06-13 PROCEDURE — 77385 HCHG IMRT DELIVERY SIMPLE: CPT | Performed by: RADIOLOGY

## 2019-06-14 ENCOUNTER — HOSPITAL ENCOUNTER (OUTPATIENT)
Dept: RADIATION ONCOLOGY | Facility: MEDICAL CENTER | Age: 79
End: 2019-06-14

## 2019-06-14 LAB
CHEMOTHERAPY INFUSION START DATE: NORMAL
CHEMOTHERAPY RECORDS: 2
CHEMOTHERAPY RECORDS: 5000
CHEMOTHERAPY RECORDS: NORMAL
CHEMOTHERAPY RX CANCER: NORMAL
RAD ONC ARIA COURSE TREATMENT ELAPSED DAYS: NORMAL
RAD ONC ARIA PLAN TREATMENT DATES: NORMAL
RAD ONC ARIA REFERENCE POINT DOSAGE GIVEN TO DATE: 10
RAD ONC ARIA REFERENCE POINT DOSAGE GIVEN TO DATE: 10.19
RAD ONC ARIA REFERENCE POINT ID: NORMAL
RAD ONC ARIA REFERENCE POINT ID: NORMAL
RAD ONC ARIA REFERENCE POINT SESSION DOSAGE GIVEN: 2
RAD ONC ARIA REFERENCE POINT SESSION DOSAGE GIVEN: 2.04

## 2019-06-14 PROCEDURE — 77385 HCHG IMRT DELIVERY SIMPLE: CPT | Performed by: RADIOLOGY

## 2019-06-14 PROCEDURE — 77014 PR CT GUIDANCE PLACEMENT RAD THERAPY FIELDS: CPT | Mod: 26 | Performed by: RADIOLOGY

## 2019-06-14 PROCEDURE — 77427 RADIATION TX MANAGEMENT X5: CPT | Performed by: RADIOLOGY

## 2019-06-17 ENCOUNTER — HOSPITAL ENCOUNTER (OUTPATIENT)
Dept: RADIATION ONCOLOGY | Facility: MEDICAL CENTER | Age: 79
End: 2019-06-17

## 2019-06-17 LAB
CHEMOTHERAPY INFUSION START DATE: NORMAL
CHEMOTHERAPY RECORDS: 2
CHEMOTHERAPY RECORDS: 5000
CHEMOTHERAPY RECORDS: NORMAL
CHEMOTHERAPY RX CANCER: NORMAL
RAD ONC ARIA COURSE TREATMENT ELAPSED DAYS: NORMAL
RAD ONC ARIA PLAN TREATMENT DATES: NORMAL
RAD ONC ARIA REFERENCE POINT DOSAGE GIVEN TO DATE: 12
RAD ONC ARIA REFERENCE POINT DOSAGE GIVEN TO DATE: 12.23
RAD ONC ARIA REFERENCE POINT ID: NORMAL
RAD ONC ARIA REFERENCE POINT ID: NORMAL
RAD ONC ARIA REFERENCE POINT SESSION DOSAGE GIVEN: 2
RAD ONC ARIA REFERENCE POINT SESSION DOSAGE GIVEN: 2.04

## 2019-06-17 PROCEDURE — 77385 HCHG IMRT DELIVERY SIMPLE: CPT | Performed by: RADIOLOGY

## 2019-06-17 PROCEDURE — 77014 PR CT GUIDANCE PLACEMENT RAD THERAPY FIELDS: CPT | Mod: 26 | Performed by: RADIOLOGY

## 2019-06-18 ENCOUNTER — HOSPITAL ENCOUNTER (OUTPATIENT)
Dept: RADIATION ONCOLOGY | Facility: MEDICAL CENTER | Age: 79
End: 2019-06-18

## 2019-06-18 LAB
CHEMOTHERAPY INFUSION START DATE: NORMAL
CHEMOTHERAPY RECORDS: 2
CHEMOTHERAPY RECORDS: 5000
CHEMOTHERAPY RECORDS: NORMAL
CHEMOTHERAPY RX CANCER: NORMAL
RAD ONC ARIA COURSE TREATMENT ELAPSED DAYS: NORMAL
RAD ONC ARIA PLAN TREATMENT DATES: NORMAL
RAD ONC ARIA REFERENCE POINT DOSAGE GIVEN TO DATE: 14
RAD ONC ARIA REFERENCE POINT DOSAGE GIVEN TO DATE: 14.27
RAD ONC ARIA REFERENCE POINT ID: NORMAL
RAD ONC ARIA REFERENCE POINT ID: NORMAL
RAD ONC ARIA REFERENCE POINT SESSION DOSAGE GIVEN: 2
RAD ONC ARIA REFERENCE POINT SESSION DOSAGE GIVEN: 2.04

## 2019-06-18 PROCEDURE — 77385 HCHG IMRT DELIVERY SIMPLE: CPT | Performed by: RADIOLOGY

## 2019-06-18 PROCEDURE — 77014 PR CT GUIDANCE PLACEMENT RAD THERAPY FIELDS: CPT | Mod: 26 | Performed by: RADIOLOGY

## 2019-06-19 LAB
CHEMOTHERAPY INFUSION START DATE: NORMAL
CHEMOTHERAPY RECORDS: 2
CHEMOTHERAPY RECORDS: 5000
CHEMOTHERAPY RECORDS: NORMAL
CHEMOTHERAPY RX CANCER: NORMAL
RAD ONC ARIA COURSE TREATMENT ELAPSED DAYS: NORMAL
RAD ONC ARIA PLAN TREATMENT DATES: NORMAL
RAD ONC ARIA REFERENCE POINT DOSAGE GIVEN TO DATE: 16
RAD ONC ARIA REFERENCE POINT DOSAGE GIVEN TO DATE: 16.31
RAD ONC ARIA REFERENCE POINT ID: NORMAL
RAD ONC ARIA REFERENCE POINT ID: NORMAL
RAD ONC ARIA REFERENCE POINT SESSION DOSAGE GIVEN: 2
RAD ONC ARIA REFERENCE POINT SESSION DOSAGE GIVEN: 2.04

## 2019-06-19 PROCEDURE — 77336 RADIATION PHYSICS CONSULT: CPT | Performed by: RADIOLOGY

## 2019-06-19 PROCEDURE — 77014 PR CT GUIDANCE PLACEMENT RAD THERAPY FIELDS: CPT | Mod: 26 | Performed by: RADIOLOGY

## 2019-06-19 PROCEDURE — 77385 HCHG IMRT DELIVERY SIMPLE: CPT | Performed by: RADIOLOGY

## 2019-06-20 ENCOUNTER — HOSPITAL ENCOUNTER (OUTPATIENT)
Dept: RADIATION ONCOLOGY | Facility: MEDICAL CENTER | Age: 79
End: 2019-06-20

## 2019-06-20 LAB
CHEMOTHERAPY INFUSION START DATE: NORMAL
CHEMOTHERAPY RECORDS: 2
CHEMOTHERAPY RECORDS: 5000
CHEMOTHERAPY RECORDS: NORMAL
CHEMOTHERAPY RX CANCER: NORMAL
RAD ONC ARIA COURSE TREATMENT ELAPSED DAYS: NORMAL
RAD ONC ARIA PLAN TREATMENT DATES: NORMAL
RAD ONC ARIA REFERENCE POINT DOSAGE GIVEN TO DATE: 18
RAD ONC ARIA REFERENCE POINT DOSAGE GIVEN TO DATE: 18.35
RAD ONC ARIA REFERENCE POINT ID: NORMAL
RAD ONC ARIA REFERENCE POINT ID: NORMAL
RAD ONC ARIA REFERENCE POINT SESSION DOSAGE GIVEN: 2
RAD ONC ARIA REFERENCE POINT SESSION DOSAGE GIVEN: 2.04

## 2019-06-20 PROCEDURE — 77014 PR CT GUIDANCE PLACEMENT RAD THERAPY FIELDS: CPT | Mod: 26 | Performed by: RADIOLOGY

## 2019-06-20 PROCEDURE — 77385 HCHG IMRT DELIVERY SIMPLE: CPT | Performed by: RADIOLOGY

## 2019-06-21 ENCOUNTER — HOSPITAL ENCOUNTER (OUTPATIENT)
Dept: RADIATION ONCOLOGY | Facility: MEDICAL CENTER | Age: 79
End: 2019-06-21

## 2019-06-21 LAB
CHEMOTHERAPY INFUSION START DATE: NORMAL
CHEMOTHERAPY RECORDS: 2
CHEMOTHERAPY RECORDS: 5000
CHEMOTHERAPY RECORDS: NORMAL
CHEMOTHERAPY RX CANCER: NORMAL
RAD ONC ARIA COURSE TREATMENT ELAPSED DAYS: NORMAL
RAD ONC ARIA PLAN TREATMENT DATES: NORMAL
RAD ONC ARIA REFERENCE POINT DOSAGE GIVEN TO DATE: 20
RAD ONC ARIA REFERENCE POINT DOSAGE GIVEN TO DATE: 20.39
RAD ONC ARIA REFERENCE POINT ID: NORMAL
RAD ONC ARIA REFERENCE POINT ID: NORMAL
RAD ONC ARIA REFERENCE POINT SESSION DOSAGE GIVEN: 2
RAD ONC ARIA REFERENCE POINT SESSION DOSAGE GIVEN: 2.04

## 2019-06-21 PROCEDURE — 77385 HCHG IMRT DELIVERY SIMPLE: CPT | Performed by: RADIOLOGY

## 2019-06-21 PROCEDURE — 77014 PR CT GUIDANCE PLACEMENT RAD THERAPY FIELDS: CPT | Mod: 26 | Performed by: RADIOLOGY

## 2019-06-21 PROCEDURE — 77427 RADIATION TX MANAGEMENT X5: CPT | Performed by: RADIOLOGY

## 2019-06-24 ENCOUNTER — HOSPITAL ENCOUNTER (OUTPATIENT)
Dept: RADIATION ONCOLOGY | Facility: MEDICAL CENTER | Age: 79
End: 2019-06-24

## 2019-06-24 LAB
CHEMOTHERAPY INFUSION START DATE: NORMAL
CHEMOTHERAPY RECORDS: 2
CHEMOTHERAPY RECORDS: 5000
CHEMOTHERAPY RECORDS: NORMAL
CHEMOTHERAPY RX CANCER: NORMAL
RAD ONC ARIA COURSE TREATMENT ELAPSED DAYS: NORMAL
RAD ONC ARIA PLAN TREATMENT DATES: NORMAL
RAD ONC ARIA REFERENCE POINT DOSAGE GIVEN TO DATE: 22
RAD ONC ARIA REFERENCE POINT DOSAGE GIVEN TO DATE: 22.43
RAD ONC ARIA REFERENCE POINT ID: NORMAL
RAD ONC ARIA REFERENCE POINT ID: NORMAL
RAD ONC ARIA REFERENCE POINT SESSION DOSAGE GIVEN: 2
RAD ONC ARIA REFERENCE POINT SESSION DOSAGE GIVEN: 2.04

## 2019-06-24 PROCEDURE — 77385 HCHG IMRT DELIVERY SIMPLE: CPT | Performed by: RADIOLOGY

## 2019-06-24 PROCEDURE — 77014 PR CT GUIDANCE PLACEMENT RAD THERAPY FIELDS: CPT | Mod: 26 | Performed by: RADIOLOGY

## 2019-06-25 ENCOUNTER — HOSPITAL ENCOUNTER (OUTPATIENT)
Dept: RADIATION ONCOLOGY | Facility: MEDICAL CENTER | Age: 79
End: 2019-06-25

## 2019-06-25 LAB
CHEMOTHERAPY INFUSION START DATE: NORMAL
CHEMOTHERAPY RECORDS: 2
CHEMOTHERAPY RECORDS: 5000
CHEMOTHERAPY RECORDS: NORMAL
CHEMOTHERAPY RX CANCER: NORMAL
RAD ONC ARIA COURSE TREATMENT ELAPSED DAYS: NORMAL
RAD ONC ARIA PLAN TREATMENT DATES: NORMAL
RAD ONC ARIA REFERENCE POINT DOSAGE GIVEN TO DATE: 24
RAD ONC ARIA REFERENCE POINT DOSAGE GIVEN TO DATE: 24.47
RAD ONC ARIA REFERENCE POINT ID: NORMAL
RAD ONC ARIA REFERENCE POINT ID: NORMAL
RAD ONC ARIA REFERENCE POINT SESSION DOSAGE GIVEN: 2
RAD ONC ARIA REFERENCE POINT SESSION DOSAGE GIVEN: 2.04

## 2019-06-25 PROCEDURE — 77014 PR CT GUIDANCE PLACEMENT RAD THERAPY FIELDS: CPT | Mod: 26 | Performed by: RADIOLOGY

## 2019-06-25 PROCEDURE — 77385 HCHG IMRT DELIVERY SIMPLE: CPT | Performed by: RADIOLOGY

## 2019-06-26 LAB
CHEMOTHERAPY INFUSION START DATE: NORMAL
CHEMOTHERAPY RECORDS: 2
CHEMOTHERAPY RECORDS: 5000
CHEMOTHERAPY RECORDS: NORMAL
CHEMOTHERAPY RX CANCER: NORMAL
RAD ONC ARIA COURSE TREATMENT ELAPSED DAYS: NORMAL
RAD ONC ARIA PLAN TREATMENT DATES: NORMAL
RAD ONC ARIA REFERENCE POINT DOSAGE GIVEN TO DATE: 26
RAD ONC ARIA REFERENCE POINT DOSAGE GIVEN TO DATE: 26.51
RAD ONC ARIA REFERENCE POINT ID: NORMAL
RAD ONC ARIA REFERENCE POINT ID: NORMAL
RAD ONC ARIA REFERENCE POINT SESSION DOSAGE GIVEN: 2
RAD ONC ARIA REFERENCE POINT SESSION DOSAGE GIVEN: 2.04

## 2019-06-26 PROCEDURE — 77014 PR CT GUIDANCE PLACEMENT RAD THERAPY FIELDS: CPT | Mod: 26 | Performed by: RADIOLOGY

## 2019-06-26 PROCEDURE — 77336 RADIATION PHYSICS CONSULT: CPT | Performed by: RADIOLOGY

## 2019-06-26 PROCEDURE — 77385 HCHG IMRT DELIVERY SIMPLE: CPT | Performed by: RADIOLOGY

## 2019-06-27 ENCOUNTER — HOSPITAL ENCOUNTER (OUTPATIENT)
Dept: RADIATION ONCOLOGY | Facility: MEDICAL CENTER | Age: 79
End: 2019-06-27

## 2019-06-27 LAB
CHEMOTHERAPY INFUSION START DATE: NORMAL
CHEMOTHERAPY RECORDS: 2
CHEMOTHERAPY RECORDS: 5000
CHEMOTHERAPY RECORDS: NORMAL
CHEMOTHERAPY RX CANCER: NORMAL
RAD ONC ARIA COURSE TREATMENT ELAPSED DAYS: NORMAL
RAD ONC ARIA PLAN TREATMENT DATES: NORMAL
RAD ONC ARIA REFERENCE POINT DOSAGE GIVEN TO DATE: 28
RAD ONC ARIA REFERENCE POINT DOSAGE GIVEN TO DATE: 28.55
RAD ONC ARIA REFERENCE POINT ID: NORMAL
RAD ONC ARIA REFERENCE POINT ID: NORMAL
RAD ONC ARIA REFERENCE POINT SESSION DOSAGE GIVEN: 2
RAD ONC ARIA REFERENCE POINT SESSION DOSAGE GIVEN: 2.04

## 2019-06-27 PROCEDURE — 77014 PR CT GUIDANCE PLACEMENT RAD THERAPY FIELDS: CPT | Mod: 26 | Performed by: RADIOLOGY

## 2019-06-27 PROCEDURE — 77385 HCHG IMRT DELIVERY SIMPLE: CPT | Performed by: RADIOLOGY

## 2019-06-28 ENCOUNTER — HOSPITAL ENCOUNTER (OUTPATIENT)
Dept: RADIATION ONCOLOGY | Facility: MEDICAL CENTER | Age: 79
End: 2019-06-28

## 2019-06-28 LAB
CHEMOTHERAPY INFUSION START DATE: NORMAL
CHEMOTHERAPY RECORDS: 2
CHEMOTHERAPY RECORDS: 5000
CHEMOTHERAPY RECORDS: NORMAL
CHEMOTHERAPY RX CANCER: NORMAL
RAD ONC ARIA COURSE TREATMENT ELAPSED DAYS: NORMAL
RAD ONC ARIA PLAN TREATMENT DATES: NORMAL
RAD ONC ARIA REFERENCE POINT DOSAGE GIVEN TO DATE: 30
RAD ONC ARIA REFERENCE POINT DOSAGE GIVEN TO DATE: 30.58
RAD ONC ARIA REFERENCE POINT ID: NORMAL
RAD ONC ARIA REFERENCE POINT ID: NORMAL
RAD ONC ARIA REFERENCE POINT SESSION DOSAGE GIVEN: 2
RAD ONC ARIA REFERENCE POINT SESSION DOSAGE GIVEN: 2.04

## 2019-06-28 PROCEDURE — 77385 HCHG IMRT DELIVERY SIMPLE: CPT | Performed by: RADIOLOGY

## 2019-06-28 PROCEDURE — 77014 PR CT GUIDANCE PLACEMENT RAD THERAPY FIELDS: CPT | Mod: 26 | Performed by: RADIOLOGY

## 2019-06-28 PROCEDURE — 77427 RADIATION TX MANAGEMENT X5: CPT | Performed by: RADIOLOGY

## 2019-07-01 ENCOUNTER — HOSPITAL ENCOUNTER (OUTPATIENT)
Dept: RADIATION ONCOLOGY | Facility: MEDICAL CENTER | Age: 79
End: 2019-07-31
Attending: RADIOLOGY
Payer: COMMERCIAL

## 2019-07-01 ENCOUNTER — HOSPITAL ENCOUNTER (OUTPATIENT)
Dept: RADIATION ONCOLOGY | Facility: MEDICAL CENTER | Age: 79
End: 2019-07-01

## 2019-07-01 LAB
CHEMOTHERAPY INFUSION START DATE: NORMAL
CHEMOTHERAPY RECORDS: 2
CHEMOTHERAPY RECORDS: 5000
CHEMOTHERAPY RECORDS: NORMAL
CHEMOTHERAPY RX CANCER: NORMAL
RAD ONC ARIA COURSE TREATMENT ELAPSED DAYS: NORMAL
RAD ONC ARIA PLAN TREATMENT DATES: NORMAL
RAD ONC ARIA REFERENCE POINT DOSAGE GIVEN TO DATE: 32
RAD ONC ARIA REFERENCE POINT DOSAGE GIVEN TO DATE: 32.62
RAD ONC ARIA REFERENCE POINT ID: NORMAL
RAD ONC ARIA REFERENCE POINT ID: NORMAL
RAD ONC ARIA REFERENCE POINT SESSION DOSAGE GIVEN: 2
RAD ONC ARIA REFERENCE POINT SESSION DOSAGE GIVEN: 2.04

## 2019-07-01 PROCEDURE — 77014 PR CT GUIDANCE PLACEMENT RAD THERAPY FIELDS: CPT | Mod: 26 | Performed by: RADIOLOGY

## 2019-07-01 PROCEDURE — 77385 HCHG IMRT DELIVERY SIMPLE: CPT | Performed by: RADIOLOGY

## 2019-07-02 ENCOUNTER — HOSPITAL ENCOUNTER (OUTPATIENT)
Dept: RADIATION ONCOLOGY | Facility: MEDICAL CENTER | Age: 79
End: 2019-07-02

## 2019-07-02 LAB
CHEMOTHERAPY INFUSION START DATE: NORMAL
CHEMOTHERAPY RECORDS: 2
CHEMOTHERAPY RECORDS: 5000
CHEMOTHERAPY RECORDS: NORMAL
CHEMOTHERAPY RX CANCER: NORMAL
RAD ONC ARIA COURSE TREATMENT ELAPSED DAYS: NORMAL
RAD ONC ARIA PLAN TREATMENT DATES: NORMAL
RAD ONC ARIA REFERENCE POINT DOSAGE GIVEN TO DATE: 34
RAD ONC ARIA REFERENCE POINT DOSAGE GIVEN TO DATE: 34.66
RAD ONC ARIA REFERENCE POINT ID: NORMAL
RAD ONC ARIA REFERENCE POINT ID: NORMAL
RAD ONC ARIA REFERENCE POINT SESSION DOSAGE GIVEN: 2
RAD ONC ARIA REFERENCE POINT SESSION DOSAGE GIVEN: 2.04

## 2019-07-02 PROCEDURE — 77385 HCHG IMRT DELIVERY SIMPLE: CPT | Performed by: RADIOLOGY

## 2019-07-02 PROCEDURE — 77014 PR CT GUIDANCE PLACEMENT RAD THERAPY FIELDS: CPT | Mod: 26 | Performed by: RADIOLOGY

## 2019-07-03 LAB
CHEMOTHERAPY INFUSION START DATE: NORMAL
CHEMOTHERAPY RECORDS: 2
CHEMOTHERAPY RECORDS: 5000
CHEMOTHERAPY RECORDS: NORMAL
CHEMOTHERAPY RX CANCER: NORMAL
RAD ONC ARIA COURSE TREATMENT ELAPSED DAYS: NORMAL
RAD ONC ARIA PLAN TREATMENT DATES: NORMAL
RAD ONC ARIA REFERENCE POINT DOSAGE GIVEN TO DATE: 36
RAD ONC ARIA REFERENCE POINT DOSAGE GIVEN TO DATE: 36.7
RAD ONC ARIA REFERENCE POINT ID: NORMAL
RAD ONC ARIA REFERENCE POINT ID: NORMAL
RAD ONC ARIA REFERENCE POINT SESSION DOSAGE GIVEN: 2
RAD ONC ARIA REFERENCE POINT SESSION DOSAGE GIVEN: 2.04

## 2019-07-03 PROCEDURE — 77385 HCHG IMRT DELIVERY SIMPLE: CPT | Performed by: RADIOLOGY

## 2019-07-03 PROCEDURE — 77336 RADIATION PHYSICS CONSULT: CPT | Performed by: RADIOLOGY

## 2019-07-03 PROCEDURE — 77014 PR CT GUIDANCE PLACEMENT RAD THERAPY FIELDS: CPT | Mod: 26 | Performed by: RADIOLOGY

## 2019-07-04 ENCOUNTER — HOSPITAL ENCOUNTER (OUTPATIENT)
Dept: RADIATION ONCOLOGY | Facility: MEDICAL CENTER | Age: 79
End: 2019-07-04

## 2019-07-04 LAB
CHEMOTHERAPY INFUSION START DATE: NORMAL
CHEMOTHERAPY RECORDS: 2
CHEMOTHERAPY RECORDS: 5000
CHEMOTHERAPY RECORDS: NORMAL
CHEMOTHERAPY RX CANCER: NORMAL
RAD ONC ARIA COURSE TREATMENT ELAPSED DAYS: NORMAL
RAD ONC ARIA PLAN TREATMENT DATES: NORMAL
RAD ONC ARIA REFERENCE POINT DOSAGE GIVEN TO DATE: 38
RAD ONC ARIA REFERENCE POINT DOSAGE GIVEN TO DATE: 38.74
RAD ONC ARIA REFERENCE POINT ID: NORMAL
RAD ONC ARIA REFERENCE POINT ID: NORMAL
RAD ONC ARIA REFERENCE POINT SESSION DOSAGE GIVEN: 2
RAD ONC ARIA REFERENCE POINT SESSION DOSAGE GIVEN: 2.04

## 2019-07-04 PROCEDURE — 77014 PR CT GUIDANCE PLACEMENT RAD THERAPY FIELDS: CPT | Mod: 26 | Performed by: RADIOLOGY

## 2019-07-04 PROCEDURE — 77385 HCHG IMRT DELIVERY SIMPLE: CPT | Performed by: RADIOLOGY

## 2019-07-08 ENCOUNTER — HOSPITAL ENCOUNTER (OUTPATIENT)
Dept: RADIATION ONCOLOGY | Facility: MEDICAL CENTER | Age: 79
End: 2019-07-08

## 2019-07-08 ENCOUNTER — HOSPITAL ENCOUNTER (INPATIENT)
Dept: HOSPITAL 8 - ED | Age: 79
LOS: 2 days | Discharge: HOME | DRG: 246 | End: 2019-07-10
Attending: INTERNAL MEDICINE | Admitting: INTERNAL MEDICINE
Payer: COMMERCIAL

## 2019-07-08 VITALS — BODY MASS INDEX: 27.1 KG/M2 | WEIGHT: 168.65 LBS | HEIGHT: 66 IN

## 2019-07-08 VITALS — DIASTOLIC BLOOD PRESSURE: 76 MMHG | SYSTOLIC BLOOD PRESSURE: 151 MMHG

## 2019-07-08 VITALS — DIASTOLIC BLOOD PRESSURE: 54 MMHG | SYSTOLIC BLOOD PRESSURE: 94 MMHG

## 2019-07-08 VITALS — SYSTOLIC BLOOD PRESSURE: 127 MMHG | DIASTOLIC BLOOD PRESSURE: 70 MMHG
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
## 2019-07-08 DIAGNOSIS — D68.69: ICD-10-CM

## 2019-07-08 DIAGNOSIS — I48.92: ICD-10-CM

## 2019-07-08 DIAGNOSIS — Z85.46: ICD-10-CM

## 2019-07-08 DIAGNOSIS — T82.857A: Primary | ICD-10-CM

## 2019-07-08 DIAGNOSIS — E11.40: ICD-10-CM

## 2019-07-08 DIAGNOSIS — I25.119: ICD-10-CM

## 2019-07-08 DIAGNOSIS — Z79.02: ICD-10-CM

## 2019-07-08 DIAGNOSIS — Z92.3: ICD-10-CM

## 2019-07-08 DIAGNOSIS — I50.32: ICD-10-CM

## 2019-07-08 DIAGNOSIS — I25.5: ICD-10-CM

## 2019-07-08 DIAGNOSIS — I11.0: ICD-10-CM

## 2019-07-08 DIAGNOSIS — Z87.891: ICD-10-CM

## 2019-07-08 DIAGNOSIS — Z92.21: ICD-10-CM

## 2019-07-08 DIAGNOSIS — M10.9: ICD-10-CM

## 2019-07-08 DIAGNOSIS — Z82.49: ICD-10-CM

## 2019-07-08 DIAGNOSIS — I25.82: ICD-10-CM

## 2019-07-08 DIAGNOSIS — E78.5: ICD-10-CM

## 2019-07-08 DIAGNOSIS — Z80.8: ICD-10-CM

## 2019-07-08 DIAGNOSIS — K21.9: ICD-10-CM

## 2019-07-08 DIAGNOSIS — I21.4: ICD-10-CM

## 2019-07-08 LAB
ALBUMIN SERPL-MCNC: 4.1 G/DL (ref 3.4–5)
ANION GAP SERPL CALC-SCNC: 7 MMOL/L (ref 5–15)
APTT BLD: 28 SECONDS (ref 25–31)
BASOPHILS # BLD AUTO: 0.02 X10^3/UL (ref 0–0.1)
BASOPHILS NFR BLD AUTO: 0 % (ref 0–1)
CALCIUM SERPL-MCNC: 9 MG/DL (ref 8.5–10.1)
CHEMOTHERAPY INFUSION START DATE: NORMAL
CHEMOTHERAPY RECORDS: 2
CHEMOTHERAPY RECORDS: 5000
CHEMOTHERAPY RECORDS: NORMAL
CHEMOTHERAPY RX CANCER: NORMAL
CHLORIDE SERPL-SCNC: 106 MMOL/L (ref 98–107)
CREAT SERPL-MCNC: 1.19 MG/DL (ref 0.7–1.3)
CULTURE INDICATED?: NO
EOSINOPHIL # BLD AUTO: 0.19 X10^3/UL (ref 0–0.4)
EOSINOPHIL NFR BLD AUTO: 4 % (ref 1–7)
ERYTHROCYTE [DISTWIDTH] IN BLOOD BY AUTOMATED COUNT: 14.4 % (ref 9.4–14.8)
INR PPP: 1.01 (ref 0.93–1.1)
LYMPHOCYTES # BLD AUTO: 0.7 X10^3/UL (ref 1–3.4)
LYMPHOCYTES NFR BLD AUTO: 16 % (ref 22–44)
MCH RBC QN AUTO: 30.5 PG (ref 27.5–34.5)
MCHC RBC AUTO-ENTMCNC: 32.6 G/DL (ref 33.2–36.2)
MCV RBC AUTO: 93.6 FL (ref 81–97)
MD: NO
MICROSCOPIC: (no result)
MONOCYTES # BLD AUTO: 0.55 X10^3/UL (ref 0.2–0.8)
MONOCYTES NFR BLD AUTO: 12 % (ref 2–9)
NEUTROPHILS # BLD AUTO: 3.06 X10^3/UL (ref 1.8–6.8)
NEUTROPHILS NFR BLD AUTO: 68 % (ref 42–75)
PLATELET # BLD AUTO: 154 X10^3/UL (ref 130–400)
PMV BLD AUTO: 8.4 FL (ref 7.4–10.4)
PROTHROMBIN TIME: 10.6 SECONDS (ref 9.6–11.5)
RAD ONC ARIA COURSE TREATMENT ELAPSED DAYS: NORMAL
RAD ONC ARIA PLAN TREATMENT DATES: NORMAL
RAD ONC ARIA REFERENCE POINT DOSAGE GIVEN TO DATE: 40
RAD ONC ARIA REFERENCE POINT DOSAGE GIVEN TO DATE: 40.78
RAD ONC ARIA REFERENCE POINT ID: NORMAL
RAD ONC ARIA REFERENCE POINT ID: NORMAL
RAD ONC ARIA REFERENCE POINT SESSION DOSAGE GIVEN: 2
RAD ONC ARIA REFERENCE POINT SESSION DOSAGE GIVEN: 2.04
RBC # BLD AUTO: 4.8 X10^6/UL (ref 4.38–5.82)
T4 FREE SERPL-MCNC: 0.84 NG/DL (ref 0.76–1.46)
TROPONIN I SERPL-MCNC: < 0.015 NG/ML (ref 0–0.04)
TSH SERPL-ACNC: 0.62 MIU/L (ref 0.36–3.74)

## 2019-07-08 PROCEDURE — 93458 L HRT ARTERY/VENTRICLE ANGIO: CPT

## 2019-07-08 PROCEDURE — C1725 CATH, TRANSLUMIN NON-LASER: HCPCS

## 2019-07-08 PROCEDURE — 80048 BASIC METABOLIC PNL TOTAL CA: CPT

## 2019-07-08 PROCEDURE — C1769 GUIDE WIRE: HCPCS

## 2019-07-08 PROCEDURE — 77014 PR CT GUIDANCE PLACEMENT RAD THERAPY FIELDS: CPT | Mod: 26 | Performed by: RADIOLOGY

## 2019-07-08 PROCEDURE — 83735 ASSAY OF MAGNESIUM: CPT

## 2019-07-08 PROCEDURE — 82962 GLUCOSE BLOOD TEST: CPT

## 2019-07-08 PROCEDURE — 83880 ASSAY OF NATRIURETIC PEPTIDE: CPT

## 2019-07-08 PROCEDURE — 99157 MOD SED OTHER PHYS/QHP EA: CPT

## 2019-07-08 PROCEDURE — C1894 INTRO/SHEATH, NON-LASER: HCPCS

## 2019-07-08 PROCEDURE — 84100 ASSAY OF PHOSPHORUS: CPT

## 2019-07-08 PROCEDURE — 84443 ASSAY THYROID STIM HORMONE: CPT

## 2019-07-08 PROCEDURE — 99285 EMERGENCY DEPT VISIT HI MDM: CPT

## 2019-07-08 PROCEDURE — 84439 ASSAY OF FREE THYROXINE: CPT

## 2019-07-08 PROCEDURE — 82040 ASSAY OF SERUM ALBUMIN: CPT

## 2019-07-08 PROCEDURE — 85730 THROMBOPLASTIN TIME PARTIAL: CPT

## 2019-07-08 PROCEDURE — 81003 URINALYSIS AUTO W/O SCOPE: CPT

## 2019-07-08 PROCEDURE — 77385 HCHG IMRT DELIVERY SIMPLE: CPT | Performed by: RADIOLOGY

## 2019-07-08 PROCEDURE — 99156 MOD SED OTH PHYS/QHP 5/>YRS: CPT

## 2019-07-08 PROCEDURE — C9600 PERC DRUG-EL COR STENT SING: HCPCS

## 2019-07-08 PROCEDURE — 85025 COMPLETE CBC W/AUTO DIFF WBC: CPT

## 2019-07-08 PROCEDURE — 77427 RADIATION TX MANAGEMENT X5: CPT | Performed by: RADIOLOGY

## 2019-07-08 PROCEDURE — 84484 ASSAY OF TROPONIN QUANT: CPT

## 2019-07-08 PROCEDURE — 36415 COLL VENOUS BLD VENIPUNCTURE: CPT

## 2019-07-08 PROCEDURE — 85610 PROTHROMBIN TIME: CPT

## 2019-07-08 PROCEDURE — 80053 COMPREHEN METABOLIC PANEL: CPT

## 2019-07-08 PROCEDURE — C1874 STENT, COATED/COV W/DEL SYS: HCPCS

## 2019-07-08 PROCEDURE — 80061 LIPID PANEL: CPT

## 2019-07-08 PROCEDURE — C1887 CATHETER, GUIDING: HCPCS

## 2019-07-08 PROCEDURE — 93306 TTE W/DOPPLER COMPLETE: CPT

## 2019-07-08 PROCEDURE — 93005 ELECTROCARDIOGRAM TRACING: CPT

## 2019-07-08 PROCEDURE — 71045 X-RAY EXAM CHEST 1 VIEW: CPT

## 2019-07-08 PROCEDURE — C1760 CLOSURE DEV, VASC: HCPCS

## 2019-07-08 RX ADMIN — CARVEDILOL SCH MG: 3.12 TABLET, FILM COATED ORAL at 16:37

## 2019-07-08 RX ADMIN — SODIUM CHLORIDE SCH MLS/HR: 0.9 INJECTION, SOLUTION INTRAVENOUS at 19:20

## 2019-07-08 RX ADMIN — ISOSORBIDE DINITRATE SCH MG: 10 TABLET ORAL at 22:20

## 2019-07-08 RX ADMIN — ATORVASTATIN CALCIUM SCH MG: 20 TABLET, FILM COATED ORAL at 22:20

## 2019-07-08 RX ADMIN — GABAPENTIN SCH MG: 300 CAPSULE ORAL at 22:20

## 2019-07-08 RX ADMIN — INSULIN LISPRO SCH UNITS: 100 INJECTION, SOLUTION INTRAVENOUS; SUBCUTANEOUS at 22:12

## 2019-07-08 RX ADMIN — ENOXAPARIN SODIUM SCH MG: 40 INJECTION SUBCUTANEOUS at 14:21

## 2019-07-08 RX ADMIN — SODIUM CHLORIDE, PRESERVATIVE FREE SCH ML: 5 INJECTION INTRAVENOUS at 22:20

## 2019-07-08 RX ADMIN — ISOSORBIDE DINITRATE SCH MG: 10 TABLET ORAL at 16:28

## 2019-07-08 RX ADMIN — INSULIN LISPRO SCH UNITS: 100 INJECTION, SOLUTION INTRAVENOUS; SUBCUTANEOUS at 16:00

## 2019-07-08 RX ADMIN — LISINOPRIL SCH MG: 20 TABLET ORAL at 22:20

## 2019-07-08 NOTE — NUR
PT ATTEMPTING TO REST. PT STATES HE FEELS SOB WHEN HE DOZES OFF. OXYGEN SAT 
DIPS TO 88 AND THEN BACK UP TO 93. PLACED ON 2L NC. CONTINUE TO MONITOR

## 2019-07-08 NOTE — NUR
PT IS RECEIVING RADIATION FOR PROSTATE CA/REINA TO BONE AND UPON ARRIVAL C/O 
SOB. PT FOUND TO HAVE LOW HR AND SENT TO ER

## 2019-07-09 VITALS — SYSTOLIC BLOOD PRESSURE: 108 MMHG | DIASTOLIC BLOOD PRESSURE: 57 MMHG

## 2019-07-09 VITALS — SYSTOLIC BLOOD PRESSURE: 121 MMHG | DIASTOLIC BLOOD PRESSURE: 63 MMHG

## 2019-07-09 VITALS — DIASTOLIC BLOOD PRESSURE: 53 MMHG | SYSTOLIC BLOOD PRESSURE: 110 MMHG

## 2019-07-09 VITALS — SYSTOLIC BLOOD PRESSURE: 93 MMHG | DIASTOLIC BLOOD PRESSURE: 51 MMHG

## 2019-07-09 VITALS — DIASTOLIC BLOOD PRESSURE: 84 MMHG | SYSTOLIC BLOOD PRESSURE: 127 MMHG

## 2019-07-09 LAB
ALBUMIN SERPL-MCNC: 3.2 G/DL (ref 3.4–5)
ALP SERPL-CCNC: 29 U/L (ref 45–117)
ALT SERPL-CCNC: 38 U/L (ref 12–78)
ANION GAP SERPL CALC-SCNC: 7 MMOL/L (ref 5–15)
BASOPHILS # BLD AUTO: 0.02 X10^3/UL (ref 0–0.1)
BASOPHILS NFR BLD AUTO: 0 % (ref 0–1)
BILIRUB SERPL-MCNC: 0.8 MG/DL (ref 0.2–1)
CALCIUM SERPL-MCNC: 8.4 MG/DL (ref 8.5–10.1)
CHLORIDE SERPL-SCNC: 109 MMOL/L (ref 98–107)
CHOL/HDL RATIO: 4.1
CREAT SERPL-MCNC: 1.47 MG/DL (ref 0.7–1.3)
EOSINOPHIL # BLD AUTO: 0.19 X10^3/UL (ref 0–0.4)
EOSINOPHIL NFR BLD AUTO: 5 % (ref 1–7)
ERYTHROCYTE [DISTWIDTH] IN BLOOD BY AUTOMATED COUNT: 14.5 % (ref 9.4–14.8)
HDL CHOL %: 25 % (ref 26–37)
HDL CHOLESTEROL (DIRECT): 28 MG/DL (ref 40–60)
LDL CHOLESTEROL,CALCULATED: 42 MG/DL (ref 54–169)
LDLC/HDLC SERPL: 1.5 {RATIO} (ref 0.5–3)
LYMPHOCYTES # BLD AUTO: 0.73 X10^3/UL (ref 1–3.4)
LYMPHOCYTES NFR BLD AUTO: 19 % (ref 22–44)
MCH RBC QN AUTO: 30.1 PG (ref 27.5–34.5)
MCHC RBC AUTO-ENTMCNC: 32.8 G/DL (ref 33.2–36.2)
MCV RBC AUTO: 91.9 FL (ref 81–97)
MD: NO
MONOCYTES # BLD AUTO: 0.46 X10^3/UL (ref 0.2–0.8)
MONOCYTES NFR BLD AUTO: 12 % (ref 2–9)
NEUTROPHILS # BLD AUTO: 2.4 X10^3/UL (ref 1.8–6.8)
NEUTROPHILS NFR BLD AUTO: 63 % (ref 42–75)
PLATELET # BLD AUTO: 117 X10^3/UL (ref 130–400)
PMV BLD AUTO: 8 FL (ref 7.4–10.4)
PROT SERPL-MCNC: 5.9 G/DL (ref 6.4–8.2)
RBC # BLD AUTO: 4.04 X10^6/UL (ref 4.38–5.82)
TRIGL SERPL-MCNC: 218 MG/DL (ref 50–200)
VLDLC SERPL CALC-MCNC: 44 MG/DL (ref 0–25)

## 2019-07-09 RX ADMIN — GABAPENTIN SCH MG: 300 CAPSULE ORAL at 20:23

## 2019-07-09 RX ADMIN — DUTASTERIDE SCH MG: 0.5 CAPSULE ORAL at 09:32

## 2019-07-09 RX ADMIN — LISINOPRIL SCH MG: 20 TABLET ORAL at 09:32

## 2019-07-09 RX ADMIN — SODIUM CHLORIDE SCH MLS/HR: 0.9 INJECTION, SOLUTION INTRAVENOUS at 15:00

## 2019-07-09 RX ADMIN — CLOPIDOGREL SCH MG: 75 TABLET, FILM COATED ORAL at 09:32

## 2019-07-09 RX ADMIN — SODIUM CHLORIDE, PRESERVATIVE FREE SCH ML: 5 INJECTION INTRAVENOUS at 09:34

## 2019-07-09 RX ADMIN — ALLOPURINOL SCH MG: 300 TABLET ORAL at 09:32

## 2019-07-09 RX ADMIN — GABAPENTIN SCH MG: 300 CAPSULE ORAL at 09:32

## 2019-07-09 RX ADMIN — ISOSORBIDE DINITRATE SCH MG: 10 TABLET ORAL at 09:32

## 2019-07-09 RX ADMIN — FENOFIBRATE SCH MG: 145 TABLET, FILM COATED ORAL at 09:32

## 2019-07-09 RX ADMIN — SODIUM CHLORIDE, PRESERVATIVE FREE SCH ML: 5 INJECTION INTRAVENOUS at 20:23

## 2019-07-09 RX ADMIN — ISOSORBIDE DINITRATE SCH MG: 10 TABLET ORAL at 16:00

## 2019-07-09 RX ADMIN — MAGNESIUM SULFATE HEPTAHYDRATE SCH MLS/HR: 40 INJECTION, SOLUTION INTRAVENOUS at 09:31

## 2019-07-09 RX ADMIN — LISINOPRIL SCH MG: 20 TABLET ORAL at 20:23

## 2019-07-09 RX ADMIN — ATORVASTATIN CALCIUM SCH MG: 20 TABLET, FILM COATED ORAL at 20:23

## 2019-07-09 RX ADMIN — SODIUM CHLORIDE SCH MLS/HR: 0.9 INJECTION, SOLUTION INTRAVENOUS at 06:13

## 2019-07-09 RX ADMIN — MAGNESIUM SULFATE HEPTAHYDRATE SCH MLS/HR: 40 INJECTION, SOLUTION INTRAVENOUS at 11:48

## 2019-07-09 RX ADMIN — INSULIN LISPRO SCH UNITS: 100 INJECTION, SOLUTION INTRAVENOUS; SUBCUTANEOUS at 11:48

## 2019-07-09 RX ADMIN — INSULIN LISPRO SCH UNITS: 100 INJECTION, SOLUTION INTRAVENOUS; SUBCUTANEOUS at 07:00

## 2019-07-09 RX ADMIN — ENOXAPARIN SODIUM SCH MG: 40 INJECTION SUBCUTANEOUS at 11:44

## 2019-07-09 RX ADMIN — CARVEDILOL SCH MG: 3.12 TABLET, FILM COATED ORAL at 06:00

## 2019-07-09 RX ADMIN — CARVEDILOL SCH MG: 3.12 TABLET, FILM COATED ORAL at 16:16

## 2019-07-09 RX ADMIN — INSULIN LISPRO SCH UNITS: 100 INJECTION, SOLUTION INTRAVENOUS; SUBCUTANEOUS at 20:28

## 2019-07-09 RX ADMIN — ISOSORBIDE DINITRATE SCH MG: 10 TABLET ORAL at 20:23

## 2019-07-09 RX ADMIN — INSULIN LISPRO SCH UNITS: 100 INJECTION, SOLUTION INTRAVENOUS; SUBCUTANEOUS at 16:00

## 2019-07-10 VITALS — SYSTOLIC BLOOD PRESSURE: 135 MMHG | DIASTOLIC BLOOD PRESSURE: 80 MMHG

## 2019-07-10 VITALS — SYSTOLIC BLOOD PRESSURE: 155 MMHG | DIASTOLIC BLOOD PRESSURE: 78 MMHG

## 2019-07-10 VITALS — SYSTOLIC BLOOD PRESSURE: 133 MMHG | DIASTOLIC BLOOD PRESSURE: 74 MMHG

## 2019-07-10 LAB
ANION GAP SERPL CALC-SCNC: 9 MMOL/L (ref 5–15)
BASOPHILS # BLD AUTO: 0.02 X10^3/UL (ref 0–0.1)
BASOPHILS NFR BLD AUTO: 0 % (ref 0–1)
CALCIUM SERPL-MCNC: 8.6 MG/DL (ref 8.5–10.1)
CHLORIDE SERPL-SCNC: 106 MMOL/L (ref 98–107)
CREAT SERPL-MCNC: 1.12 MG/DL (ref 0.7–1.3)
EOSINOPHIL # BLD AUTO: 0.14 X10^3/UL (ref 0–0.4)
EOSINOPHIL NFR BLD AUTO: 3 % (ref 1–7)
ERYTHROCYTE [DISTWIDTH] IN BLOOD BY AUTOMATED COUNT: 14.6 % (ref 9.4–14.8)
LYMPHOCYTES # BLD AUTO: 0.54 X10^3/UL (ref 1–3.4)
LYMPHOCYTES NFR BLD AUTO: 12 % (ref 22–44)
MCH RBC QN AUTO: 30.3 PG (ref 27.5–34.5)
MCHC RBC AUTO-ENTMCNC: 32.9 G/DL (ref 33.2–36.2)
MCV RBC AUTO: 91.8 FL (ref 81–97)
MD: NO
MONOCYTES # BLD AUTO: 0.52 X10^3/UL (ref 0.2–0.8)
MONOCYTES NFR BLD AUTO: 11 % (ref 2–9)
NEUTROPHILS # BLD AUTO: 3.42 X10^3/UL (ref 1.8–6.8)
NEUTROPHILS NFR BLD AUTO: 74 % (ref 42–75)
PLATELET # BLD AUTO: 129 X10^3/UL (ref 130–400)
PMV BLD AUTO: 7.5 FL (ref 7.4–10.4)
RBC # BLD AUTO: 4.47 X10^6/UL (ref 4.38–5.82)

## 2019-07-10 PROCEDURE — 027034Z DILATION OF CORONARY ARTERY, ONE ARTERY WITH DRUG-ELUTING INTRALUMINAL DEVICE, PERCUTANEOUS APPROACH: ICD-10-PCS | Performed by: INTERNAL MEDICINE

## 2019-07-10 PROCEDURE — B2111ZZ FLUOROSCOPY OF MULTIPLE CORONARY ARTERIES USING LOW OSMOLAR CONTRAST: ICD-10-PCS | Performed by: INTERNAL MEDICINE

## 2019-07-10 PROCEDURE — B2181ZZ FLUOROSCOPY OF LEFT INTERNAL MAMMARY BYPASS GRAFT USING LOW OSMOLAR CONTRAST: ICD-10-PCS | Performed by: INTERNAL MEDICINE

## 2019-07-10 PROCEDURE — 4A023N7 MEASUREMENT OF CARDIAC SAMPLING AND PRESSURE, LEFT HEART, PERCUTANEOUS APPROACH: ICD-10-PCS | Performed by: INTERNAL MEDICINE

## 2019-07-10 PROCEDURE — B21F1ZZ FLUOROSCOPY OF OTHER BYPASS GRAFT USING LOW OSMOLAR CONTRAST: ICD-10-PCS | Performed by: INTERNAL MEDICINE

## 2019-07-10 PROCEDURE — B2151ZZ FLUOROSCOPY OF LEFT HEART USING LOW OSMOLAR CONTRAST: ICD-10-PCS | Performed by: INTERNAL MEDICINE

## 2019-07-10 RX ADMIN — INSULIN LISPRO SCH UNITS: 100 INJECTION, SOLUTION INTRAVENOUS; SUBCUTANEOUS at 11:00

## 2019-07-10 RX ADMIN — DUTASTERIDE SCH MG: 0.5 CAPSULE ORAL at 09:41

## 2019-07-10 RX ADMIN — CLOPIDOGREL SCH MG: 75 TABLET, FILM COATED ORAL at 09:41

## 2019-07-10 RX ADMIN — GABAPENTIN SCH MG: 300 CAPSULE ORAL at 09:40

## 2019-07-10 RX ADMIN — ALLOPURINOL SCH MG: 300 TABLET ORAL at 09:40

## 2019-07-10 RX ADMIN — LISINOPRIL SCH MG: 20 TABLET ORAL at 09:40

## 2019-07-10 RX ADMIN — CARVEDILOL SCH MG: 3.12 TABLET, FILM COATED ORAL at 06:27

## 2019-07-10 RX ADMIN — INSULIN LISPRO SCH UNITS: 100 INJECTION, SOLUTION INTRAVENOUS; SUBCUTANEOUS at 07:50

## 2019-07-10 RX ADMIN — SODIUM CHLORIDE, PRESERVATIVE FREE SCH ML: 5 INJECTION INTRAVENOUS at 09:41

## 2019-07-10 RX ADMIN — SODIUM CHLORIDE SCH MLS/HR: 0.9 INJECTION, SOLUTION INTRAVENOUS at 06:00

## 2019-07-10 RX ADMIN — FENOFIBRATE SCH MG: 145 TABLET, FILM COATED ORAL at 09:40

## 2019-07-12 ENCOUNTER — HOSPITAL ENCOUNTER (OUTPATIENT)
Dept: RADIATION ONCOLOGY | Facility: MEDICAL CENTER | Age: 79
End: 2019-07-12

## 2019-07-12 LAB
CHEMOTHERAPY INFUSION START DATE: NORMAL
CHEMOTHERAPY RECORDS: 2
CHEMOTHERAPY RECORDS: 5000
CHEMOTHERAPY RECORDS: NORMAL
CHEMOTHERAPY RX CANCER: NORMAL
RAD ONC ARIA COURSE TREATMENT ELAPSED DAYS: NORMAL
RAD ONC ARIA PLAN TREATMENT DATES: NORMAL
RAD ONC ARIA REFERENCE POINT DOSAGE GIVEN TO DATE: 42
RAD ONC ARIA REFERENCE POINT DOSAGE GIVEN TO DATE: 42.82
RAD ONC ARIA REFERENCE POINT ID: NORMAL
RAD ONC ARIA REFERENCE POINT ID: NORMAL
RAD ONC ARIA REFERENCE POINT SESSION DOSAGE GIVEN: 2
RAD ONC ARIA REFERENCE POINT SESSION DOSAGE GIVEN: 2.04

## 2019-07-12 PROCEDURE — 77385 HCHG IMRT DELIVERY SIMPLE: CPT | Performed by: RADIOLOGY

## 2019-07-12 PROCEDURE — 77014 PR CT GUIDANCE PLACEMENT RAD THERAPY FIELDS: CPT | Mod: 26 | Performed by: RADIOLOGY

## 2019-07-14 ENCOUNTER — HOSPITAL ENCOUNTER (OUTPATIENT)
Dept: RADIATION ONCOLOGY | Facility: MEDICAL CENTER | Age: 79
End: 2019-07-14

## 2019-07-15 ENCOUNTER — HOSPITAL ENCOUNTER (EMERGENCY)
Dept: HOSPITAL 8 - ED | Age: 79
Discharge: HOME | End: 2019-07-15
Payer: COMMERCIAL

## 2019-07-15 VITALS — BODY MASS INDEX: 25.51 KG/M2 | WEIGHT: 158.73 LBS | HEIGHT: 66 IN

## 2019-07-15 VITALS — SYSTOLIC BLOOD PRESSURE: 141 MMHG | DIASTOLIC BLOOD PRESSURE: 75 MMHG

## 2019-07-15 DIAGNOSIS — I10: ICD-10-CM

## 2019-07-15 DIAGNOSIS — R10.13: ICD-10-CM

## 2019-07-15 DIAGNOSIS — Z87.891: ICD-10-CM

## 2019-07-15 DIAGNOSIS — R07.2: Primary | ICD-10-CM

## 2019-07-15 DIAGNOSIS — R19.7: ICD-10-CM

## 2019-07-15 DIAGNOSIS — M25.561: ICD-10-CM

## 2019-07-15 DIAGNOSIS — Z85.9: ICD-10-CM

## 2019-07-15 DIAGNOSIS — I25.2: ICD-10-CM

## 2019-07-15 DIAGNOSIS — E11.9: ICD-10-CM

## 2019-07-15 DIAGNOSIS — Z95.1: ICD-10-CM

## 2019-07-15 DIAGNOSIS — E78.5: ICD-10-CM

## 2019-07-15 DIAGNOSIS — K21.9: ICD-10-CM

## 2019-07-15 LAB
ALBUMIN SERPL-MCNC: 3.5 G/DL (ref 3.4–5)
ALP SERPL-CCNC: 55 U/L (ref 45–117)
ALT SERPL-CCNC: 29 U/L (ref 12–78)
ANION GAP SERPL CALC-SCNC: 7 MMOL/L (ref 5–15)
APTT BLD: 32 SECONDS (ref 25–31)
BASOPHILS # BLD AUTO: 0.02 X10^3/UL (ref 0–0.1)
BASOPHILS NFR BLD AUTO: 0 % (ref 0–1)
BILIRUB SERPL-MCNC: 0.6 MG/DL (ref 0.2–1)
CALCIUM SERPL-MCNC: 8.9 MG/DL (ref 8.5–10.1)
CHEMOTHERAPY INFUSION START DATE: NORMAL
CHEMOTHERAPY RECORDS: 2
CHEMOTHERAPY RECORDS: 5000
CHEMOTHERAPY RECORDS: NORMAL
CHEMOTHERAPY RX CANCER: NORMAL
CHLORIDE SERPL-SCNC: 108 MMOL/L (ref 98–107)
CREAT SERPL-MCNC: 1.17 MG/DL (ref 0.7–1.3)
CULTURE INDICATED?: NO
EOSINOPHIL # BLD AUTO: 0.16 X10^3/UL (ref 0–0.4)
EOSINOPHIL NFR BLD AUTO: 3 % (ref 1–7)
ERYTHROCYTE [DISTWIDTH] IN BLOOD BY AUTOMATED COUNT: 14.8 % (ref 9.4–14.8)
INR PPP: 1.02 (ref 0.93–1.1)
LYMPHOCYTES # BLD AUTO: 0.89 X10^3/UL (ref 1–3.4)
LYMPHOCYTES NFR BLD AUTO: 18 % (ref 22–44)
MCH RBC QN AUTO: 30.8 PG (ref 27.5–34.5)
MCHC RBC AUTO-ENTMCNC: 32.9 G/DL (ref 33.2–36.2)
MCV RBC AUTO: 93.4 FL (ref 81–97)
MD: NO
MICROSCOPIC: (no result)
MONOCYTES # BLD AUTO: 0.66 X10^3/UL (ref 0.2–0.8)
MONOCYTES NFR BLD AUTO: 13 % (ref 2–9)
NEUTROPHILS # BLD AUTO: 3.28 X10^3/UL (ref 1.8–6.8)
NEUTROPHILS NFR BLD AUTO: 66 % (ref 42–75)
PLATELET # BLD AUTO: 225 X10^3/UL (ref 130–400)
PMV BLD AUTO: 7.6 FL (ref 7.4–10.4)
PROT SERPL-MCNC: 7.3 G/DL (ref 6.4–8.2)
PROTHROMBIN TIME: 10.7 SECONDS (ref 9.6–11.5)
RAD ONC ARIA COURSE TREATMENT ELAPSED DAYS: NORMAL
RAD ONC ARIA PLAN TREATMENT DATES: NORMAL
RAD ONC ARIA REFERENCE POINT DOSAGE GIVEN TO DATE: 44
RAD ONC ARIA REFERENCE POINT DOSAGE GIVEN TO DATE: 44.86
RAD ONC ARIA REFERENCE POINT ID: NORMAL
RAD ONC ARIA REFERENCE POINT ID: NORMAL
RAD ONC ARIA REFERENCE POINT SESSION DOSAGE GIVEN: 2
RAD ONC ARIA REFERENCE POINT SESSION DOSAGE GIVEN: 2.04
RBC # BLD AUTO: 4.38 X10^6/UL (ref 4.38–5.82)
TROPONIN I SERPL-MCNC: 0.02 NG/ML (ref 0–0.04)

## 2019-07-15 PROCEDURE — 77014 PR CT GUIDANCE PLACEMENT RAD THERAPY FIELDS: CPT | Mod: 26 | Performed by: RADIOLOGY

## 2019-07-15 PROCEDURE — 77300 RADIATION THERAPY DOSE PLAN: CPT | Mod: 26 | Performed by: RADIOLOGY

## 2019-07-15 PROCEDURE — 71275 CT ANGIOGRAPHY CHEST: CPT

## 2019-07-15 PROCEDURE — 77300 RADIATION THERAPY DOSE PLAN: CPT | Performed by: RADIOLOGY

## 2019-07-15 PROCEDURE — 36415 COLL VENOUS BLD VENIPUNCTURE: CPT

## 2019-07-15 PROCEDURE — 76700 US EXAM ABDOM COMPLETE: CPT

## 2019-07-15 PROCEDURE — 74021 RADEX ABDOMEN 3+ VIEWS: CPT

## 2019-07-15 PROCEDURE — 71045 X-RAY EXAM CHEST 1 VIEW: CPT

## 2019-07-15 PROCEDURE — 80053 COMPREHEN METABOLIC PANEL: CPT

## 2019-07-15 PROCEDURE — 84484 ASSAY OF TROPONIN QUANT: CPT

## 2019-07-15 PROCEDURE — 85730 THROMBOPLASTIN TIME PARTIAL: CPT

## 2019-07-15 PROCEDURE — 83690 ASSAY OF LIPASE: CPT

## 2019-07-15 PROCEDURE — 77385 HCHG IMRT DELIVERY SIMPLE: CPT | Performed by: RADIOLOGY

## 2019-07-15 PROCEDURE — 85025 COMPLETE CBC W/AUTO DIFF WBC: CPT

## 2019-07-15 PROCEDURE — 77338 DESIGN MLC DEVICE FOR IMRT: CPT | Mod: 26 | Performed by: RADIOLOGY

## 2019-07-15 PROCEDURE — 85610 PROTHROMBIN TIME: CPT

## 2019-07-15 PROCEDURE — 77338 DESIGN MLC DEVICE FOR IMRT: CPT | Mod: XU | Performed by: RADIOLOGY

## 2019-07-15 PROCEDURE — 81001 URINALYSIS AUTO W/SCOPE: CPT

## 2019-07-15 PROCEDURE — 85379 FIBRIN DEGRADATION QUANT: CPT

## 2019-07-15 PROCEDURE — 99284 EMERGENCY DEPT VISIT MOD MDM: CPT

## 2019-07-15 PROCEDURE — 93005 ELECTROCARDIOGRAM TRACING: CPT

## 2019-07-16 ENCOUNTER — HOSPITAL ENCOUNTER (OUTPATIENT)
Dept: RADIATION ONCOLOGY | Facility: MEDICAL CENTER | Age: 79
End: 2019-07-16

## 2019-07-16 LAB
CHEMOTHERAPY INFUSION START DATE: NORMAL
CHEMOTHERAPY RECORDS: 2
CHEMOTHERAPY RECORDS: 5000
CHEMOTHERAPY RECORDS: NORMAL
CHEMOTHERAPY RX CANCER: NORMAL
RAD ONC ARIA COURSE TREATMENT ELAPSED DAYS: NORMAL
RAD ONC ARIA PLAN TREATMENT DATES: NORMAL
RAD ONC ARIA REFERENCE POINT DOSAGE GIVEN TO DATE: 46
RAD ONC ARIA REFERENCE POINT DOSAGE GIVEN TO DATE: 46.9
RAD ONC ARIA REFERENCE POINT ID: NORMAL
RAD ONC ARIA REFERENCE POINT ID: NORMAL
RAD ONC ARIA REFERENCE POINT SESSION DOSAGE GIVEN: 2
RAD ONC ARIA REFERENCE POINT SESSION DOSAGE GIVEN: 2.04

## 2019-07-16 PROCEDURE — 77336 RADIATION PHYSICS CONSULT: CPT | Performed by: RADIOLOGY

## 2019-07-16 PROCEDURE — 77385 HCHG IMRT DELIVERY SIMPLE: CPT | Performed by: RADIOLOGY

## 2019-07-16 PROCEDURE — 77014 PR CT GUIDANCE PLACEMENT RAD THERAPY FIELDS: CPT | Mod: 26 | Performed by: RADIOLOGY

## 2019-07-17 LAB
CHEMOTHERAPY INFUSION START DATE: NORMAL
CHEMOTHERAPY RECORDS: 2
CHEMOTHERAPY RECORDS: 5000
CHEMOTHERAPY RECORDS: NORMAL
CHEMOTHERAPY RX CANCER: NORMAL
RAD ONC ARIA COURSE TREATMENT ELAPSED DAYS: NORMAL
RAD ONC ARIA PLAN TREATMENT DATES: NORMAL
RAD ONC ARIA REFERENCE POINT DOSAGE GIVEN TO DATE: 48
RAD ONC ARIA REFERENCE POINT DOSAGE GIVEN TO DATE: 48.93
RAD ONC ARIA REFERENCE POINT ID: NORMAL
RAD ONC ARIA REFERENCE POINT ID: NORMAL
RAD ONC ARIA REFERENCE POINT SESSION DOSAGE GIVEN: 2
RAD ONC ARIA REFERENCE POINT SESSION DOSAGE GIVEN: 2.04

## 2019-07-17 PROCEDURE — 77014 PR CT GUIDANCE PLACEMENT RAD THERAPY FIELDS: CPT | Mod: 26 | Performed by: RADIOLOGY

## 2019-07-17 PROCEDURE — 77385 HCHG IMRT DELIVERY SIMPLE: CPT | Performed by: RADIOLOGY

## 2019-07-18 ENCOUNTER — HOSPITAL ENCOUNTER (OUTPATIENT)
Dept: RADIATION ONCOLOGY | Facility: MEDICAL CENTER | Age: 79
End: 2019-07-18

## 2019-07-18 LAB
CHEMOTHERAPY INFUSION START DATE: NORMAL
CHEMOTHERAPY RECORDS: 2
CHEMOTHERAPY RECORDS: 5000
CHEMOTHERAPY RECORDS: NORMAL
CHEMOTHERAPY RX CANCER: NORMAL
RAD ONC ARIA COURSE TREATMENT ELAPSED DAYS: NORMAL
RAD ONC ARIA PLAN TREATMENT DATES: NORMAL
RAD ONC ARIA REFERENCE POINT DOSAGE GIVEN TO DATE: 50
RAD ONC ARIA REFERENCE POINT DOSAGE GIVEN TO DATE: 50.97
RAD ONC ARIA REFERENCE POINT ID: NORMAL
RAD ONC ARIA REFERENCE POINT ID: NORMAL
RAD ONC ARIA REFERENCE POINT SESSION DOSAGE GIVEN: 2
RAD ONC ARIA REFERENCE POINT SESSION DOSAGE GIVEN: 2.04

## 2019-07-18 PROCEDURE — 77427 RADIATION TX MANAGEMENT X5: CPT | Performed by: RADIOLOGY

## 2019-07-18 PROCEDURE — 77385 HCHG IMRT DELIVERY SIMPLE: CPT | Performed by: RADIOLOGY

## 2019-07-18 PROCEDURE — 77014 PR CT GUIDANCE PLACEMENT RAD THERAPY FIELDS: CPT | Mod: 26 | Performed by: RADIOLOGY

## 2019-07-19 ENCOUNTER — HOSPITAL ENCOUNTER (OUTPATIENT)
Dept: RADIATION ONCOLOGY | Facility: MEDICAL CENTER | Age: 79
End: 2019-07-19

## 2019-07-19 LAB
CHEMOTHERAPY INFUSION START DATE: NORMAL
CHEMOTHERAPY RECORDS: 2
CHEMOTHERAPY RECORDS: 3000
CHEMOTHERAPY RECORDS: NORMAL
CHEMOTHERAPY RX CANCER: NORMAL
RAD ONC ARIA COURSE TREATMENT ELAPSED DAYS: NORMAL
RAD ONC ARIA PLAN TREATMENT DATES: NORMAL
RAD ONC ARIA REFERENCE POINT DOSAGE GIVEN TO DATE: 2
RAD ONC ARIA REFERENCE POINT DOSAGE GIVEN TO DATE: 2.03
RAD ONC ARIA REFERENCE POINT ID: NORMAL
RAD ONC ARIA REFERENCE POINT ID: NORMAL
RAD ONC ARIA REFERENCE POINT SESSION DOSAGE GIVEN: 2
RAD ONC ARIA REFERENCE POINT SESSION DOSAGE GIVEN: 2.03

## 2019-07-19 PROCEDURE — 77014 PR CT GUIDANCE PLACEMENT RAD THERAPY FIELDS: CPT | Mod: 26 | Performed by: RADIOLOGY

## 2019-07-19 PROCEDURE — 77385 HCHG IMRT DELIVERY SIMPLE: CPT | Performed by: RADIOLOGY

## 2019-07-22 ENCOUNTER — HOSPITAL ENCOUNTER (OUTPATIENT)
Dept: RADIATION ONCOLOGY | Facility: MEDICAL CENTER | Age: 79
End: 2019-07-22

## 2019-07-22 LAB
CHEMOTHERAPY INFUSION START DATE: NORMAL
CHEMOTHERAPY RECORDS: 2
CHEMOTHERAPY RECORDS: 3000
CHEMOTHERAPY RECORDS: NORMAL
CHEMOTHERAPY RX CANCER: NORMAL
RAD ONC ARIA COURSE TREATMENT ELAPSED DAYS: NORMAL
RAD ONC ARIA PLAN TREATMENT DATES: NORMAL
RAD ONC ARIA REFERENCE POINT DOSAGE GIVEN TO DATE: 4
RAD ONC ARIA REFERENCE POINT DOSAGE GIVEN TO DATE: 4.05
RAD ONC ARIA REFERENCE POINT ID: NORMAL
RAD ONC ARIA REFERENCE POINT ID: NORMAL
RAD ONC ARIA REFERENCE POINT SESSION DOSAGE GIVEN: 2
RAD ONC ARIA REFERENCE POINT SESSION DOSAGE GIVEN: 2.03

## 2019-07-22 PROCEDURE — 77385 HCHG IMRT DELIVERY SIMPLE: CPT | Performed by: RADIOLOGY

## 2019-07-22 PROCEDURE — 77014 PR CT GUIDANCE PLACEMENT RAD THERAPY FIELDS: CPT | Mod: 26 | Performed by: RADIOLOGY

## 2019-07-23 ENCOUNTER — HOSPITAL ENCOUNTER (OUTPATIENT)
Dept: RADIATION ONCOLOGY | Facility: MEDICAL CENTER | Age: 79
End: 2019-07-23

## 2019-07-23 LAB
CHEMOTHERAPY INFUSION START DATE: NORMAL
CHEMOTHERAPY RECORDS: 2
CHEMOTHERAPY RECORDS: 3000
CHEMOTHERAPY RECORDS: NORMAL
CHEMOTHERAPY RX CANCER: NORMAL
RAD ONC ARIA COURSE TREATMENT ELAPSED DAYS: NORMAL
RAD ONC ARIA PLAN TREATMENT DATES: NORMAL
RAD ONC ARIA REFERENCE POINT DOSAGE GIVEN TO DATE: 6
RAD ONC ARIA REFERENCE POINT DOSAGE GIVEN TO DATE: 6.08
RAD ONC ARIA REFERENCE POINT ID: NORMAL
RAD ONC ARIA REFERENCE POINT ID: NORMAL
RAD ONC ARIA REFERENCE POINT SESSION DOSAGE GIVEN: 2
RAD ONC ARIA REFERENCE POINT SESSION DOSAGE GIVEN: 2.03

## 2019-07-23 PROCEDURE — 77385 HCHG IMRT DELIVERY SIMPLE: CPT | Performed by: RADIOLOGY

## 2019-07-23 PROCEDURE — 77336 RADIATION PHYSICS CONSULT: CPT | Performed by: RADIOLOGY

## 2019-07-23 PROCEDURE — 77014 PR CT GUIDANCE PLACEMENT RAD THERAPY FIELDS: CPT | Mod: 26 | Performed by: RADIOLOGY

## 2019-07-24 ENCOUNTER — HOSPITAL ENCOUNTER (OUTPATIENT)
Dept: RADIATION ONCOLOGY | Facility: MEDICAL CENTER | Age: 79
End: 2019-07-24

## 2019-07-24 LAB
CHEMOTHERAPY INFUSION START DATE: NORMAL
CHEMOTHERAPY RECORDS: 2
CHEMOTHERAPY RECORDS: 3000
CHEMOTHERAPY RECORDS: NORMAL
CHEMOTHERAPY RX CANCER: NORMAL
RAD ONC ARIA COURSE TREATMENT ELAPSED DAYS: NORMAL
RAD ONC ARIA PLAN TREATMENT DATES: NORMAL
RAD ONC ARIA REFERENCE POINT DOSAGE GIVEN TO DATE: 8
RAD ONC ARIA REFERENCE POINT DOSAGE GIVEN TO DATE: 8.11
RAD ONC ARIA REFERENCE POINT ID: NORMAL
RAD ONC ARIA REFERENCE POINT ID: NORMAL
RAD ONC ARIA REFERENCE POINT SESSION DOSAGE GIVEN: 2
RAD ONC ARIA REFERENCE POINT SESSION DOSAGE GIVEN: 2.03

## 2019-07-24 PROCEDURE — 77385 HCHG IMRT DELIVERY SIMPLE: CPT | Performed by: RADIOLOGY

## 2019-07-24 PROCEDURE — 77014 PR CT GUIDANCE PLACEMENT RAD THERAPY FIELDS: CPT | Mod: 26 | Performed by: RADIOLOGY

## 2019-07-25 ENCOUNTER — HOSPITAL ENCOUNTER (OUTPATIENT)
Dept: RADIATION ONCOLOGY | Facility: MEDICAL CENTER | Age: 79
End: 2019-07-25

## 2019-07-25 LAB
CHEMOTHERAPY INFUSION START DATE: NORMAL
CHEMOTHERAPY RECORDS: 2
CHEMOTHERAPY RECORDS: 3000
CHEMOTHERAPY RECORDS: NORMAL
CHEMOTHERAPY RX CANCER: NORMAL
RAD ONC ARIA COURSE TREATMENT ELAPSED DAYS: NORMAL
RAD ONC ARIA PLAN TREATMENT DATES: NORMAL
RAD ONC ARIA REFERENCE POINT DOSAGE GIVEN TO DATE: 10
RAD ONC ARIA REFERENCE POINT DOSAGE GIVEN TO DATE: 10.13
RAD ONC ARIA REFERENCE POINT ID: NORMAL
RAD ONC ARIA REFERENCE POINT ID: NORMAL
RAD ONC ARIA REFERENCE POINT SESSION DOSAGE GIVEN: 2
RAD ONC ARIA REFERENCE POINT SESSION DOSAGE GIVEN: 2.03

## 2019-07-25 PROCEDURE — 77014 PR CT GUIDANCE PLACEMENT RAD THERAPY FIELDS: CPT | Mod: 26 | Performed by: RADIOLOGY

## 2019-07-25 PROCEDURE — 77385 HCHG IMRT DELIVERY SIMPLE: CPT | Performed by: RADIOLOGY

## 2019-07-25 PROCEDURE — 77427 RADIATION TX MANAGEMENT X5: CPT | Performed by: RADIOLOGY

## 2019-07-26 ENCOUNTER — HOSPITAL ENCOUNTER (OUTPATIENT)
Dept: RADIATION ONCOLOGY | Facility: MEDICAL CENTER | Age: 79
End: 2019-07-26

## 2019-07-26 ENCOUNTER — HOSPITAL ENCOUNTER (OUTPATIENT)
Dept: HOSPITAL 8 - CFH | Age: 79
Discharge: HOME | End: 2019-07-26
Attending: INTERNAL MEDICINE
Payer: COMMERCIAL

## 2019-07-26 DIAGNOSIS — I42.9: Primary | ICD-10-CM

## 2019-07-26 LAB
ANION GAP SERPL CALC-SCNC: 8 MMOL/L (ref 5–15)
CALCIUM SERPL-MCNC: 9.6 MG/DL (ref 8.5–10.1)
CHEMOTHERAPY INFUSION START DATE: NORMAL
CHEMOTHERAPY RECORDS: 2
CHEMOTHERAPY RECORDS: 3000
CHEMOTHERAPY RECORDS: NORMAL
CHEMOTHERAPY RX CANCER: NORMAL
CHLORIDE SERPL-SCNC: 104 MMOL/L (ref 98–107)
CREAT SERPL-MCNC: 1.17 MG/DL (ref 0.7–1.3)
RAD ONC ARIA COURSE TREATMENT ELAPSED DAYS: NORMAL
RAD ONC ARIA PLAN TREATMENT DATES: NORMAL
RAD ONC ARIA REFERENCE POINT DOSAGE GIVEN TO DATE: 12
RAD ONC ARIA REFERENCE POINT DOSAGE GIVEN TO DATE: 12.16
RAD ONC ARIA REFERENCE POINT ID: NORMAL
RAD ONC ARIA REFERENCE POINT ID: NORMAL
RAD ONC ARIA REFERENCE POINT SESSION DOSAGE GIVEN: 2
RAD ONC ARIA REFERENCE POINT SESSION DOSAGE GIVEN: 2.03

## 2019-07-26 PROCEDURE — 83880 ASSAY OF NATRIURETIC PEPTIDE: CPT

## 2019-07-26 PROCEDURE — 77014 PR CT GUIDANCE PLACEMENT RAD THERAPY FIELDS: CPT | Mod: 26 | Performed by: RADIOLOGY

## 2019-07-26 PROCEDURE — 36415 COLL VENOUS BLD VENIPUNCTURE: CPT

## 2019-07-26 PROCEDURE — 77385 HCHG IMRT DELIVERY SIMPLE: CPT | Performed by: RADIOLOGY

## 2019-07-26 PROCEDURE — 80048 BASIC METABOLIC PNL TOTAL CA: CPT

## 2019-07-29 ENCOUNTER — HOSPITAL ENCOUNTER (EMERGENCY)
Dept: HOSPITAL 8 - ED | Age: 79
Discharge: HOME | End: 2019-07-29
Payer: COMMERCIAL

## 2019-07-29 ENCOUNTER — HOSPITAL ENCOUNTER (OUTPATIENT)
Dept: RADIATION ONCOLOGY | Facility: MEDICAL CENTER | Age: 79
End: 2019-07-29

## 2019-07-29 VITALS — DIASTOLIC BLOOD PRESSURE: 79 MMHG | SYSTOLIC BLOOD PRESSURE: 134 MMHG

## 2019-07-29 VITALS — BODY MASS INDEX: 26.22 KG/M2 | HEIGHT: 66 IN | WEIGHT: 163.14 LBS

## 2019-07-29 DIAGNOSIS — E11.9: ICD-10-CM

## 2019-07-29 DIAGNOSIS — K21.9: ICD-10-CM

## 2019-07-29 DIAGNOSIS — I48.0: Primary | ICD-10-CM

## 2019-07-29 DIAGNOSIS — I25.2: ICD-10-CM

## 2019-07-29 DIAGNOSIS — I10: ICD-10-CM

## 2019-07-29 DIAGNOSIS — Z95.5: ICD-10-CM

## 2019-07-29 DIAGNOSIS — E78.5: ICD-10-CM

## 2019-07-29 DIAGNOSIS — Z95.1: ICD-10-CM

## 2019-07-29 LAB
ALBUMIN SERPL-MCNC: 4 G/DL (ref 3.4–5)
ANION GAP SERPL CALC-SCNC: 6 MMOL/L (ref 5–15)
APTT BLD: 32 SECONDS (ref 25–31)
BASOPHILS # BLD AUTO: 0.02 X10^3/UL (ref 0–0.1)
BASOPHILS NFR BLD AUTO: 0 % (ref 0–1)
CALCIUM SERPL-MCNC: 9 MG/DL (ref 8.5–10.1)
CHEMOTHERAPY INFUSION START DATE: NORMAL
CHEMOTHERAPY RECORDS: 2
CHEMOTHERAPY RECORDS: 3000
CHEMOTHERAPY RECORDS: NORMAL
CHEMOTHERAPY RX CANCER: NORMAL
CHLORIDE SERPL-SCNC: 109 MMOL/L (ref 98–107)
CREAT SERPL-MCNC: 1.2 MG/DL (ref 0.7–1.3)
EOSINOPHIL # BLD AUTO: 0.22 X10^3/UL (ref 0–0.4)
EOSINOPHIL NFR BLD AUTO: 5 % (ref 1–7)
ERYTHROCYTE [DISTWIDTH] IN BLOOD BY AUTOMATED COUNT: 14.8 % (ref 9.4–14.8)
INR PPP: 1.05 (ref 0.93–1.1)
LYMPHOCYTES # BLD AUTO: 0.78 X10^3/UL (ref 1–3.4)
LYMPHOCYTES NFR BLD AUTO: 18 % (ref 22–44)
MCH RBC QN AUTO: 30.5 PG (ref 27.5–34.5)
MCHC RBC AUTO-ENTMCNC: 33 G/DL (ref 33.2–36.2)
MCV RBC AUTO: 92.6 FL (ref 81–97)
MD: NO
MONOCYTES # BLD AUTO: 0.45 X10^3/UL (ref 0.2–0.8)
MONOCYTES NFR BLD AUTO: 11 % (ref 2–9)
NEUTROPHILS # BLD AUTO: 2.81 X10^3/UL (ref 1.8–6.8)
NEUTROPHILS NFR BLD AUTO: 66 % (ref 42–75)
PLATELET # BLD AUTO: 200 X10^3/UL (ref 130–400)
PMV BLD AUTO: 7.3 FL (ref 7.4–10.4)
PROTHROMBIN TIME: 11 SECONDS (ref 9.6–11.5)
RAD ONC ARIA COURSE TREATMENT ELAPSED DAYS: NORMAL
RAD ONC ARIA PLAN TREATMENT DATES: NORMAL
RAD ONC ARIA REFERENCE POINT DOSAGE GIVEN TO DATE: 14
RAD ONC ARIA REFERENCE POINT DOSAGE GIVEN TO DATE: 14.19
RAD ONC ARIA REFERENCE POINT ID: NORMAL
RAD ONC ARIA REFERENCE POINT ID: NORMAL
RAD ONC ARIA REFERENCE POINT SESSION DOSAGE GIVEN: 2
RAD ONC ARIA REFERENCE POINT SESSION DOSAGE GIVEN: 2.03
RBC # BLD AUTO: 4.8 X10^6/UL (ref 4.38–5.82)
TROPONIN I SERPL-MCNC: 0.02 NG/ML (ref 0–0.04)
TROPONIN I SERPL-MCNC: 0.02 NG/ML (ref 0–0.04)

## 2019-07-29 PROCEDURE — 83880 ASSAY OF NATRIURETIC PEPTIDE: CPT

## 2019-07-29 PROCEDURE — 84484 ASSAY OF TROPONIN QUANT: CPT

## 2019-07-29 PROCEDURE — 92960 CARDIOVERSION ELECTRIC EXT: CPT

## 2019-07-29 PROCEDURE — 71045 X-RAY EXAM CHEST 1 VIEW: CPT

## 2019-07-29 PROCEDURE — 80048 BASIC METABOLIC PNL TOTAL CA: CPT

## 2019-07-29 PROCEDURE — 85610 PROTHROMBIN TIME: CPT

## 2019-07-29 PROCEDURE — 82040 ASSAY OF SERUM ALBUMIN: CPT

## 2019-07-29 PROCEDURE — 77014 PR CT GUIDANCE PLACEMENT RAD THERAPY FIELDS: CPT | Mod: 26 | Performed by: RADIOLOGY

## 2019-07-29 PROCEDURE — 93005 ELECTROCARDIOGRAM TRACING: CPT

## 2019-07-29 PROCEDURE — 77385 HCHG IMRT DELIVERY SIMPLE: CPT | Performed by: RADIOLOGY

## 2019-07-29 PROCEDURE — 85025 COMPLETE CBC W/AUTO DIFF WBC: CPT

## 2019-07-29 PROCEDURE — 85730 THROMBOPLASTIN TIME PARTIAL: CPT

## 2019-07-29 PROCEDURE — 36415 COLL VENOUS BLD VENIPUNCTURE: CPT

## 2019-07-29 PROCEDURE — 99291 CRITICAL CARE FIRST HOUR: CPT

## 2019-07-29 NOTE — NUR
PT CARDIOVERTED, TOLERATED WELL. PT IS NOW FULLY ALERT AND ORIENTED, TALKING 
AND JOKING WITH WIFE. LEONEL. NAD.

## 2019-07-29 NOTE — NUR
PT AWAKE AND ALERT, PT STATES "I'M READY TO GO HOME, I FEEL FINE". PT DENIES 
ANY SYMPTOMS OR PAIN. VSS. NAD.

## 2019-07-30 ENCOUNTER — HOSPITAL ENCOUNTER (OUTPATIENT)
Dept: RADIATION ONCOLOGY | Facility: MEDICAL CENTER | Age: 79
End: 2019-07-30

## 2019-07-30 LAB
CHEMOTHERAPY INFUSION START DATE: NORMAL
CHEMOTHERAPY RECORDS: 2
CHEMOTHERAPY RECORDS: 3000
CHEMOTHERAPY RECORDS: NORMAL
CHEMOTHERAPY RX CANCER: NORMAL
RAD ONC ARIA COURSE TREATMENT ELAPSED DAYS: NORMAL
RAD ONC ARIA PLAN TREATMENT DATES: NORMAL
RAD ONC ARIA REFERENCE POINT DOSAGE GIVEN TO DATE: 16
RAD ONC ARIA REFERENCE POINT DOSAGE GIVEN TO DATE: 16.22
RAD ONC ARIA REFERENCE POINT ID: NORMAL
RAD ONC ARIA REFERENCE POINT ID: NORMAL
RAD ONC ARIA REFERENCE POINT SESSION DOSAGE GIVEN: 2
RAD ONC ARIA REFERENCE POINT SESSION DOSAGE GIVEN: 2.03

## 2019-07-30 PROCEDURE — 77014 PR CT GUIDANCE PLACEMENT RAD THERAPY FIELDS: CPT | Mod: 26 | Performed by: RADIOLOGY

## 2019-07-30 PROCEDURE — 77385 HCHG IMRT DELIVERY SIMPLE: CPT | Performed by: RADIOLOGY

## 2019-07-30 PROCEDURE — 77336 RADIATION PHYSICS CONSULT: CPT | Performed by: RADIOLOGY

## 2019-07-31 ENCOUNTER — HOSPITAL ENCOUNTER (OUTPATIENT)
Dept: RADIATION ONCOLOGY | Facility: MEDICAL CENTER | Age: 79
End: 2019-07-31

## 2019-07-31 LAB
CHEMOTHERAPY INFUSION START DATE: NORMAL
CHEMOTHERAPY RECORDS: 2
CHEMOTHERAPY RECORDS: 3000
CHEMOTHERAPY RECORDS: NORMAL
CHEMOTHERAPY RX CANCER: NORMAL
RAD ONC ARIA COURSE TREATMENT ELAPSED DAYS: NORMAL
RAD ONC ARIA PLAN TREATMENT DATES: NORMAL
RAD ONC ARIA REFERENCE POINT DOSAGE GIVEN TO DATE: 18
RAD ONC ARIA REFERENCE POINT DOSAGE GIVEN TO DATE: 18.24
RAD ONC ARIA REFERENCE POINT ID: NORMAL
RAD ONC ARIA REFERENCE POINT ID: NORMAL
RAD ONC ARIA REFERENCE POINT SESSION DOSAGE GIVEN: 2
RAD ONC ARIA REFERENCE POINT SESSION DOSAGE GIVEN: 2.03

## 2019-07-31 PROCEDURE — 77014 PR CT GUIDANCE PLACEMENT RAD THERAPY FIELDS: CPT | Mod: 26 | Performed by: RADIOLOGY

## 2019-07-31 PROCEDURE — 77385 HCHG IMRT DELIVERY SIMPLE: CPT | Performed by: RADIOLOGY

## 2019-08-01 ENCOUNTER — HOSPITAL ENCOUNTER (OUTPATIENT)
Dept: RADIATION ONCOLOGY | Facility: MEDICAL CENTER | Age: 79
End: 2019-08-31
Attending: RADIOLOGY
Payer: COMMERCIAL

## 2019-08-01 ENCOUNTER — HOSPITAL ENCOUNTER (OUTPATIENT)
Dept: RADIATION ONCOLOGY | Facility: MEDICAL CENTER | Age: 79
End: 2019-08-01

## 2019-08-01 LAB
CHEMOTHERAPY INFUSION START DATE: NORMAL
CHEMOTHERAPY RECORDS: 2
CHEMOTHERAPY RECORDS: 3000
CHEMOTHERAPY RECORDS: NORMAL
CHEMOTHERAPY RX CANCER: NORMAL
RAD ONC ARIA COURSE TREATMENT ELAPSED DAYS: NORMAL
RAD ONC ARIA PLAN TREATMENT DATES: NORMAL
RAD ONC ARIA REFERENCE POINT DOSAGE GIVEN TO DATE: 20
RAD ONC ARIA REFERENCE POINT DOSAGE GIVEN TO DATE: 20.27
RAD ONC ARIA REFERENCE POINT ID: NORMAL
RAD ONC ARIA REFERENCE POINT ID: NORMAL
RAD ONC ARIA REFERENCE POINT SESSION DOSAGE GIVEN: 2
RAD ONC ARIA REFERENCE POINT SESSION DOSAGE GIVEN: 2.03

## 2019-08-01 PROCEDURE — 77427 RADIATION TX MANAGEMENT X5: CPT | Performed by: RADIOLOGY

## 2019-08-01 PROCEDURE — 77385 HCHG IMRT DELIVERY SIMPLE: CPT | Performed by: RADIOLOGY

## 2019-08-01 PROCEDURE — 77014 PR CT GUIDANCE PLACEMENT RAD THERAPY FIELDS: CPT | Mod: 26 | Performed by: RADIOLOGY

## 2019-08-02 ENCOUNTER — HOSPITAL ENCOUNTER (OUTPATIENT)
Dept: RADIATION ONCOLOGY | Facility: MEDICAL CENTER | Age: 79
End: 2019-08-02

## 2019-08-02 LAB
CHEMOTHERAPY INFUSION START DATE: NORMAL
CHEMOTHERAPY RECORDS: 2
CHEMOTHERAPY RECORDS: 3000
CHEMOTHERAPY RECORDS: NORMAL
CHEMOTHERAPY RX CANCER: NORMAL
RAD ONC ARIA COURSE TREATMENT ELAPSED DAYS: NORMAL
RAD ONC ARIA PLAN TREATMENT DATES: NORMAL
RAD ONC ARIA REFERENCE POINT DOSAGE GIVEN TO DATE: 22
RAD ONC ARIA REFERENCE POINT DOSAGE GIVEN TO DATE: 22.3
RAD ONC ARIA REFERENCE POINT ID: NORMAL
RAD ONC ARIA REFERENCE POINT ID: NORMAL
RAD ONC ARIA REFERENCE POINT SESSION DOSAGE GIVEN: 2
RAD ONC ARIA REFERENCE POINT SESSION DOSAGE GIVEN: 2.03

## 2019-08-02 PROCEDURE — 77385 HCHG IMRT DELIVERY SIMPLE: CPT | Performed by: RADIOLOGY

## 2019-08-02 PROCEDURE — 77014 PR CT GUIDANCE PLACEMENT RAD THERAPY FIELDS: CPT | Mod: 26 | Performed by: RADIOLOGY

## 2019-08-05 ENCOUNTER — HOSPITAL ENCOUNTER (OUTPATIENT)
Dept: RADIATION ONCOLOGY | Facility: MEDICAL CENTER | Age: 79
End: 2019-08-05

## 2019-08-05 LAB
CHEMOTHERAPY INFUSION START DATE: NORMAL
CHEMOTHERAPY RECORDS: 2
CHEMOTHERAPY RECORDS: 3000
CHEMOTHERAPY RECORDS: NORMAL
CHEMOTHERAPY RX CANCER: NORMAL
RAD ONC ARIA COURSE TREATMENT ELAPSED DAYS: NORMAL
RAD ONC ARIA PLAN TREATMENT DATES: NORMAL
RAD ONC ARIA REFERENCE POINT DOSAGE GIVEN TO DATE: 24
RAD ONC ARIA REFERENCE POINT DOSAGE GIVEN TO DATE: 24.32
RAD ONC ARIA REFERENCE POINT ID: NORMAL
RAD ONC ARIA REFERENCE POINT ID: NORMAL
RAD ONC ARIA REFERENCE POINT SESSION DOSAGE GIVEN: 2
RAD ONC ARIA REFERENCE POINT SESSION DOSAGE GIVEN: 2.03

## 2019-08-05 PROCEDURE — 77385 HCHG IMRT DELIVERY SIMPLE: CPT | Performed by: RADIOLOGY

## 2019-08-05 PROCEDURE — 77014 PR CT GUIDANCE PLACEMENT RAD THERAPY FIELDS: CPT | Mod: 26 | Performed by: RADIOLOGY

## 2019-08-06 ENCOUNTER — HOSPITAL ENCOUNTER (OUTPATIENT)
Dept: RADIATION ONCOLOGY | Facility: MEDICAL CENTER | Age: 79
End: 2019-08-06

## 2019-08-06 LAB
CHEMOTHERAPY INFUSION START DATE: NORMAL
CHEMOTHERAPY RECORDS: 2
CHEMOTHERAPY RECORDS: 3000
CHEMOTHERAPY RECORDS: NORMAL
CHEMOTHERAPY RX CANCER: NORMAL
RAD ONC ARIA COURSE TREATMENT ELAPSED DAYS: NORMAL
RAD ONC ARIA PLAN TREATMENT DATES: NORMAL
RAD ONC ARIA REFERENCE POINT DOSAGE GIVEN TO DATE: 26
RAD ONC ARIA REFERENCE POINT DOSAGE GIVEN TO DATE: 26.35
RAD ONC ARIA REFERENCE POINT ID: NORMAL
RAD ONC ARIA REFERENCE POINT ID: NORMAL
RAD ONC ARIA REFERENCE POINT SESSION DOSAGE GIVEN: 2
RAD ONC ARIA REFERENCE POINT SESSION DOSAGE GIVEN: 2.03

## 2019-08-06 PROCEDURE — 77336 RADIATION PHYSICS CONSULT: CPT | Performed by: RADIOLOGY

## 2019-08-06 PROCEDURE — 77385 HCHG IMRT DELIVERY SIMPLE: CPT | Performed by: RADIOLOGY

## 2019-08-06 PROCEDURE — 77014 PR CT GUIDANCE PLACEMENT RAD THERAPY FIELDS: CPT | Mod: 26 | Performed by: RADIOLOGY

## 2019-08-07 ENCOUNTER — HOSPITAL ENCOUNTER (OUTPATIENT)
Dept: RADIATION ONCOLOGY | Facility: MEDICAL CENTER | Age: 79
End: 2019-08-07

## 2019-08-07 LAB
CHEMOTHERAPY INFUSION START DATE: NORMAL
CHEMOTHERAPY RECORDS: 2
CHEMOTHERAPY RECORDS: 3000
CHEMOTHERAPY RECORDS: NORMAL
CHEMOTHERAPY RX CANCER: NORMAL
RAD ONC ARIA COURSE TREATMENT ELAPSED DAYS: NORMAL
RAD ONC ARIA PLAN TREATMENT DATES: NORMAL
RAD ONC ARIA REFERENCE POINT DOSAGE GIVEN TO DATE: 28
RAD ONC ARIA REFERENCE POINT DOSAGE GIVEN TO DATE: 28.38
RAD ONC ARIA REFERENCE POINT ID: NORMAL
RAD ONC ARIA REFERENCE POINT ID: NORMAL
RAD ONC ARIA REFERENCE POINT SESSION DOSAGE GIVEN: 2
RAD ONC ARIA REFERENCE POINT SESSION DOSAGE GIVEN: 2.03

## 2019-08-07 PROCEDURE — 77385 HCHG IMRT DELIVERY SIMPLE: CPT | Performed by: RADIOLOGY

## 2019-08-07 PROCEDURE — 77014 PR CT GUIDANCE PLACEMENT RAD THERAPY FIELDS: CPT | Mod: 26 | Performed by: RADIOLOGY

## 2019-08-08 ENCOUNTER — HOSPITAL ENCOUNTER (OUTPATIENT)
Dept: RADIATION ONCOLOGY | Facility: MEDICAL CENTER | Age: 79
End: 2019-08-08

## 2019-08-08 LAB
CHEMOTHERAPY INFUSION START DATE: NORMAL
CHEMOTHERAPY RECORDS: 2
CHEMOTHERAPY RECORDS: 3000
CHEMOTHERAPY RECORDS: NORMAL
CHEMOTHERAPY RX CANCER: NORMAL
RAD ONC ARIA COURSE TREATMENT ELAPSED DAYS: NORMAL
RAD ONC ARIA PLAN TREATMENT DATES: NORMAL
RAD ONC ARIA REFERENCE POINT DOSAGE GIVEN TO DATE: 30
RAD ONC ARIA REFERENCE POINT DOSAGE GIVEN TO DATE: 30.4
RAD ONC ARIA REFERENCE POINT ID: NORMAL
RAD ONC ARIA REFERENCE POINT ID: NORMAL
RAD ONC ARIA REFERENCE POINT SESSION DOSAGE GIVEN: 2
RAD ONC ARIA REFERENCE POINT SESSION DOSAGE GIVEN: 2.03

## 2019-08-08 PROCEDURE — 77385 HCHG IMRT DELIVERY SIMPLE: CPT | Performed by: RADIOLOGY

## 2019-08-08 PROCEDURE — 77427 RADIATION TX MANAGEMENT X5: CPT | Performed by: RADIOLOGY

## 2019-08-08 PROCEDURE — 77014 PR CT GUIDANCE PLACEMENT RAD THERAPY FIELDS: CPT | Mod: 26 | Performed by: RADIOLOGY

## 2019-08-13 LAB
CHEMOTHERAPY INFUSION START DATE: NORMAL
CHEMOTHERAPY RECORDS: 2
CHEMOTHERAPY RECORDS: 2
CHEMOTHERAPY RECORDS: 3000
CHEMOTHERAPY RECORDS: 5000
CHEMOTHERAPY RECORDS: NORMAL
CHEMOTHERAPY RX CANCER: NORMAL
RAD ONC ARIA COURSE TREATMENT ELAPSED DAYS: NORMAL
RAD ONC ARIA PLAN TREATMENT DATES: NORMAL
RAD ONC ARIA REFERENCE POINT DOSAGE GIVEN TO DATE: 30
RAD ONC ARIA REFERENCE POINT DOSAGE GIVEN TO DATE: 30.4
RAD ONC ARIA REFERENCE POINT DOSAGE GIVEN TO DATE: 50
RAD ONC ARIA REFERENCE POINT DOSAGE GIVEN TO DATE: 50.97
RAD ONC ARIA REFERENCE POINT ID: NORMAL

## 2019-08-14 ENCOUNTER — HOSPITAL ENCOUNTER (OUTPATIENT)
Dept: HOSPITAL 8 - CFH | Age: 79
Discharge: HOME | End: 2019-08-14
Attending: INTERNAL MEDICINE
Payer: COMMERCIAL

## 2019-08-14 DIAGNOSIS — I42.9: Primary | ICD-10-CM

## 2019-08-14 PROCEDURE — 80048 BASIC METABOLIC PNL TOTAL CA: CPT

## 2019-08-14 PROCEDURE — 83880 ASSAY OF NATRIURETIC PEPTIDE: CPT

## 2019-08-14 PROCEDURE — 36415 COLL VENOUS BLD VENIPUNCTURE: CPT

## 2019-09-30 ENCOUNTER — HOSPITAL ENCOUNTER (OUTPATIENT)
Dept: RADIATION ONCOLOGY | Facility: MEDICAL CENTER | Age: 79
End: 2019-09-30
Attending: RADIOLOGY
Payer: COMMERCIAL

## 2019-09-30 VITALS
DIASTOLIC BLOOD PRESSURE: 83 MMHG | TEMPERATURE: 96.9 F | HEART RATE: 80 BPM | OXYGEN SATURATION: 95 % | SYSTOLIC BLOOD PRESSURE: 151 MMHG

## 2019-09-30 PROCEDURE — 99212 OFFICE O/P EST SF 10 MIN: CPT | Performed by: RADIOLOGY

## 2019-09-30 RX ORDER — APIXABAN 5 MG/1
5 TABLET, FILM COATED ORAL 2 TIMES DAILY
Refills: 5 | COMMUNITY
Start: 2019-09-06

## 2019-09-30 RX ORDER — ATORVASTATIN CALCIUM 40 MG/1
40 TABLET, FILM COATED ORAL EVERY EVENING
Refills: 3 | COMMUNITY
Start: 2019-08-20

## 2019-09-30 RX ORDER — AMLODIPINE BESYLATE 5 MG/1
TABLET ORAL
Refills: 3 | COMMUNITY
Start: 2019-07-30 | End: 2021-08-10

## 2019-09-30 RX ORDER — CARVEDILOL 3.12 MG/1
3.12 TABLET ORAL 2 TIMES DAILY WITH MEALS
Refills: 0 | COMMUNITY
Start: 2019-08-08

## 2019-09-30 ASSESSMENT — PAIN SCALES - GENERAL: PAINLEVEL: NO PAIN

## 2019-09-30 NOTE — PROGRESS NOTES
RADIATION ONCOLOGY FOLLOW UP    DATE OF SERVICE: 9/30/2019    IDENTIFICATION:   Oligo metastatic prostate cancer, PSA 12.32, Winnie score 4+4 = 8 involvement of 12 of 12 core biopsies, T7 vertebral body lesion by bone scan CAT scan and MRI, concerning pleural-based lung nodules of unknown significance, initiated on Lupron December 2018 treated with stereotactic body radiosurgery to the T7 vertebral body to 14 Gy in a single fraction in January 2019 followed by prostate radiotherapy to 8000 cGy in July 2019    INTERVAL HISTORY: I had the pleasure of seeing Mr. Flores today in follow up for his prostate cancer.  Patient states from a symptom standpoint he continues to do remarkably well.  He does not have any new areas of bony tenderness or pain.  He denies any neurologic symptoms.  He does not have any residual bowel or bladder changes.  [facility]  Unfortunately he is continued to have difficulties with coronary artery disease.  He suffered 2 heart attacks during treatment and has had a third heart attack since completion of treatment.    PHYSICAL EXAM:    Vitals:    09/30/19 1253   BP: 151/83   Pulse: 80   Temp: 36.1 °C (96.9 °F)   SpO2: 95%   Pain Score: No pain      0= Fully active, able to carry on all pre-disease performance without restriction.    GENERAL: No apparent distress.  HEENT:  Pupils are equal, round, and reactive to light.  Extraocular muscles   are intact. Sclerae nonicteric.  Conjunctivae pink.  Oral cavity, tongue   protrudes midline.   NECK:  Supple without evidence of thyromegaly.  NODES:  No peripheral adenopathy of the neck, supraclavicular fossa or axillae   bilaterally.  LUNGS:  Clear to ascultation bilaterally   HEART:  Regular rate and rhythm.  No murmur appreciated  ABDOMEN:  Soft. No evidence of hepatosplenomegaly.  Positive bowel sounds.  EXTREMITIES:  Without Edema.  NEUROLOGIC:  Cranial nerves II through XII were intact. Normal stance and gait motor and  sensory grossly within normal limits        IPSS:  IN THE PAST MONTH:     1.  Incomplete Emptying: How often have you had the sensation of not emptying your bladder?  0 = Not at all    2.  Frequency: How often have you had to urinate less than every two hours? 0 = Not at all    3.  Intermittency: How often have you found you stopped and started again several times when you urinated? 0 = Not at all    4.  Urgency: How often have you found it difficult to postpone urination? 2 = Less than half the time    5.  Weak Stream: How often have you had a weak urinary stream? 1 = Less that 1 in 5 times    6.  Straining: How often have you had to strain to start urination? 0 = Not at all    7.  Nocturia: How many times did you typically get up at night to urinate? 2 = 2 times    TOTAL: 5 1-7 = Mild    QUALITY OF LIFE DUE TO URINARY SYMPTOMS:     If you were to spend the rest of your life with your urinary condition just the way it is now, how would you feel about that? 1 = Pleased      BISI:  SEXUAL HEALTH INVENTORY FOR MEN (BISI):   Over the past 6 months:   1.  How do you rate your confidence that you could get and keep an erection?  1 = Very low    2.  When you had erections with sexual stimulation, how often were your erections hard enough for penetration (entering your partner)? 0 = No sexual activitity     3.  During sexual intercourse, how often were you able to maintain your erection after you had penetrated (entered) your partner? 0 = Did not attempt    4.  During sexual intercourse, how difficult was it to maintain your erection to completion of intercourse? 0= Did not attempt intercourse    5.  When you attempted sexual intercourse, how often was it satisfactory for you? 0 = Did not attempt intercourse    TOTAL: 1    The Sexual Health Inventory for Men further classifies ED severity with the following breakpoints: 1-7 = Severe ED    IMPRESSION:   Oligo metastatic prostate cancer, PSA 12.32, Asher score 4+4 = 8  involvement of 12 of 12 core biopsies, T7 vertebral body lesion by bone scan CAT scan and MRI, concerning pleural-based lung nodules of unknown significance, initiated on Lupron December 2018 treated with stereotactic body radiosurgery to the T7 vertebral body to 14 Gy in a single fraction in January 2019 followed by prostate radiotherapy to 8000 cGy in July 2019    RECOMMENDATIONS:   I discussed the patient his findings over a 30 minute time period, 95% of that time dedicated to an ongoing survivorship plan.  While patient continues to follow in the urology clinic at the [facility] per the contract I will release him from active follow-up in radiation oncology.        If patient has any questions or concerns, he should feel free to contact me.

## 2019-09-30 NOTE — NON-PROVIDER
Patient was seen today in clinic with Dr. King for Follow up.  Vitals signs and weight were obtained and pain assessment was completed.  Allergies and medications were reviewed with the patient.  Toxicities of treatment assessed.     Vitals/Pain:  Vitals:    09/30/19 1253   BP: 151/83   Pulse: 80   Temp: 36.1 °C (96.9 °F)   SpO2: 95%   Pain Score: No pain        Allergies:   Penicillins    Current Medications:  Current Outpatient Medications   Medication Sig Dispense Refill   • amLODIPine (NORVASC) 5 MG Tab TK 1 T PO  ONCE D  3   • ELIQUIS 5 MG Tab TK 1 T PO BID  5   • atorvastatin (LIPITOR) 20 MG Tab TK 1 T PO  ONCE D  3   • carvedilol (COREG) 3.125 MG Tab TK 1 T PO  BID AT 6 AM AND 6 PM  0   • metoprolol SR (TOPROL XL) 100 MG TB24 Take 1 Tab by mouth every day. Needs to be seen for further refills. Thank you 90 Tab 0   • gemfibrozil (LOPID) 600 MG TABS Take 1 Tab by mouth 2 times a day. 1 Tab 1   • rosuvastatin (CRESTOR) 10 MG TABS Take 1 Tab by mouth every evening. 1 Tab 1   • nitroglycerin (NITROSTAT) 0.4 MG SUBL Place 1 Tab under tongue as needed for Chest Pain (every 5 minutes up to 3 doses in 15 minutes.). 25 Tab 2   • finasteride (PROSCAR) 5 MG TABS Take 5 mg by mouth every day.     • methocarbamol (ROBAXIN) 500 MG TABS Take 500 mg by mouth 2 Times a Day.     • pantoprazole (PROTONIX) 40 MG TBEC Take 1 Tab by mouth every day. Indications: Gastroesophageal Reflux Disease 90 Tab 3   • lisinopril (PRINIVIL) 5 MG TABS Take 1 Tab by mouth every day. 90 Tab 3   • clopidogrel (PLAVIX) 75 MG TABS Take 1 Tab by mouth every day. 90 Tab 3   • aspirin (ASA) 325 MG TABS Take 325 mg by mouth every day.     • Misc Natural Products (GLUCOSAMINE CHOND COMPLEX/MSM PO) Take 2 Tabs by mouth every day.     • Multiple Vitamins-Minerals (MULTIVITAMIN PO) Take 1 Tab by mouth every day.     • hydrocodone-acetaminophen (VICODIN) 5-500 MG TABS Take 1-2 Tabs by mouth every four hours as needed.     • vitamin D (CHOLECALCIFEROL)  1000 UNIT TABS Take 1,000 Units by mouth every day.       No current facility-administered medications for this encounter.          PCP:  Flora Bedolla, Med Ass't

## 2020-07-15 ENCOUNTER — HOSPITAL ENCOUNTER (OUTPATIENT)
Dept: HOSPITAL 8 - CVU | Age: 80
Discharge: HOME | End: 2020-07-15
Attending: INTERNAL MEDICINE
Payer: COMMERCIAL

## 2020-07-15 DIAGNOSIS — I25.10: ICD-10-CM

## 2020-07-15 DIAGNOSIS — I11.9: ICD-10-CM

## 2020-07-15 DIAGNOSIS — I08.0: Primary | ICD-10-CM

## 2020-07-15 PROCEDURE — 93306 TTE W/DOPPLER COMPLETE: CPT

## 2020-07-15 PROCEDURE — A9502 TC99M TETROFOSMIN: HCPCS

## 2020-07-15 PROCEDURE — 78452 HT MUSCLE IMAGE SPECT MULT: CPT

## 2020-07-15 PROCEDURE — 93017 CV STRESS TEST TRACING ONLY: CPT

## 2020-12-07 ENCOUNTER — HOSPITAL ENCOUNTER (OUTPATIENT)
Dept: LAB | Facility: MEDICAL CENTER | Age: 80
End: 2020-12-07
Attending: ANESTHESIOLOGY
Payer: MEDICARE

## 2020-12-07 LAB — COVID ORDER STATUS COVID19: NORMAL

## 2020-12-07 PROCEDURE — U0003 INFECTIOUS AGENT DETECTION BY NUCLEIC ACID (DNA OR RNA); SEVERE ACUTE RESPIRATORY SYNDROME CORONAVIRUS 2 (SARS-COV-2) (CORONAVIRUS DISEASE [COVID-19]), AMPLIFIED PROBE TECHNIQUE, MAKING USE OF HIGH THROUGHPUT TECHNOLOGIES AS DESCRIBED BY CMS-2020-01-R: HCPCS

## 2020-12-07 PROCEDURE — C9803 HOPD COVID-19 SPEC COLLECT: HCPCS

## 2020-12-08 LAB
SARS-COV-2 RNA RESP QL NAA+PROBE: NOTDETECTED
SPECIMEN SOURCE: NORMAL

## 2020-12-29 ENCOUNTER — HOSPITAL ENCOUNTER (OUTPATIENT)
Dept: HOSPITAL 8 - LAB | Age: 80
Discharge: HOME | End: 2020-12-29
Attending: INTERNAL MEDICINE
Payer: COMMERCIAL

## 2020-12-29 DIAGNOSIS — E78.5: Primary | ICD-10-CM

## 2020-12-29 LAB
CHOL/HDL RATIO: 3.5
HDL CHOL %: 29 % (ref 26–37)
HDL CHOLESTEROL (DIRECT): 44 MG/DL (ref 40–60)
LDL CHOLESTEROL,CALCULATED: 60 MG/DL (ref 54–169)
LDLC/HDLC SERPL: 1.4 {RATIO} (ref 0.5–3)
TRIGL SERPL-MCNC: 249 MG/DL (ref 50–200)
VLDLC SERPL CALC-MCNC: 50 MG/DL (ref 0–25)

## 2020-12-29 PROCEDURE — 80061 LIPID PANEL: CPT

## 2020-12-29 PROCEDURE — 36415 COLL VENOUS BLD VENIPUNCTURE: CPT

## 2021-01-14 DIAGNOSIS — Z23 NEED FOR VACCINATION: ICD-10-CM

## 2021-07-19 ENCOUNTER — HOSPITAL ENCOUNTER (OUTPATIENT)
Dept: HOSPITAL 8 - CACL | Age: 81
Discharge: HOME | End: 2021-07-19
Attending: INTERNAL MEDICINE
Payer: COMMERCIAL

## 2021-07-19 VITALS — BODY MASS INDEX: 27.55 KG/M2 | HEIGHT: 65 IN | WEIGHT: 165.35 LBS

## 2021-07-19 DIAGNOSIS — F17.210: ICD-10-CM

## 2021-07-19 DIAGNOSIS — I48.92: ICD-10-CM

## 2021-07-19 DIAGNOSIS — I42.9: ICD-10-CM

## 2021-07-19 DIAGNOSIS — Z95.5: ICD-10-CM

## 2021-07-19 DIAGNOSIS — Z79.01: ICD-10-CM

## 2021-07-19 DIAGNOSIS — Z79.84: ICD-10-CM

## 2021-07-19 DIAGNOSIS — E11.9: ICD-10-CM

## 2021-07-19 DIAGNOSIS — Z79.899: ICD-10-CM

## 2021-07-19 DIAGNOSIS — Z79.891: ICD-10-CM

## 2021-07-19 DIAGNOSIS — E78.49: ICD-10-CM

## 2021-07-19 DIAGNOSIS — I25.10: ICD-10-CM

## 2021-07-19 DIAGNOSIS — E66.3: ICD-10-CM

## 2021-07-19 DIAGNOSIS — M10.9: ICD-10-CM

## 2021-07-19 DIAGNOSIS — K21.9: ICD-10-CM

## 2021-07-19 DIAGNOSIS — G47.30: ICD-10-CM

## 2021-07-19 DIAGNOSIS — I48.91: Primary | ICD-10-CM

## 2021-07-19 DIAGNOSIS — I10: ICD-10-CM

## 2021-07-19 DIAGNOSIS — I70.0: ICD-10-CM

## 2021-07-19 DIAGNOSIS — I34.0: ICD-10-CM

## 2021-07-19 DIAGNOSIS — Z88.0: ICD-10-CM

## 2021-07-19 DIAGNOSIS — Z20.822: ICD-10-CM

## 2021-07-19 DIAGNOSIS — C61: ICD-10-CM

## 2021-07-19 LAB
ANION GAP SERPL CALC-SCNC: 3 MMOL/L (ref 5–15)
BASOPHILS # BLD AUTO: 0 X10^3/UL (ref 0–0.1)
BASOPHILS NFR BLD AUTO: 1 % (ref 0–1)
CALCIUM SERPL-MCNC: 9.6 MG/DL (ref 8.5–10.1)
CHLORIDE SERPL-SCNC: 107 MMOL/L (ref 98–107)
CREAT SERPL-MCNC: 1.35 MG/DL (ref 0.7–1.3)
EOSINOPHIL # BLD AUTO: 0.2 X10^3/UL (ref 0–0.4)
EOSINOPHIL NFR BLD AUTO: 3 % (ref 1–7)
ERYTHROCYTE [DISTWIDTH] IN BLOOD BY AUTOMATED COUNT: 15.8 % (ref 9.4–14.8)
LYMPHOCYTES # BLD AUTO: 1 X10^3/UL (ref 1–3.4)
LYMPHOCYTES NFR BLD AUTO: 16 % (ref 22–44)
MCH RBC QN AUTO: 30.7 PG (ref 27.5–34.5)
MCHC RBC AUTO-ENTMCNC: 33.5 G/DL (ref 33.2–36.2)
MONOCYTES # BLD AUTO: 0.6 X10^3/UL (ref 0.2–0.8)
MONOCYTES NFR BLD AUTO: 11 % (ref 2–9)
NEUTROPHILS # BLD AUTO: 4.2 X10^3/UL (ref 1.8–6.8)
NEUTROPHILS NFR BLD AUTO: 70 % (ref 42–75)
PLATELET # BLD AUTO: 181 X10^3/UL (ref 130–400)
PMV BLD AUTO: 8.1 FL (ref 7.4–10.4)
RBC # BLD AUTO: 4.45 X10^6/UL (ref 4.38–5.82)

## 2021-07-19 PROCEDURE — 85025 COMPLETE CBC W/AUTO DIFF WBC: CPT

## 2021-07-19 PROCEDURE — 87635 SARS-COV-2 COVID-19 AMP PRB: CPT

## 2021-07-19 PROCEDURE — 36415 COLL VENOUS BLD VENIPUNCTURE: CPT

## 2021-07-19 PROCEDURE — 93321 DOPPLER ECHO F-UP/LMTD STD: CPT

## 2021-07-19 PROCEDURE — 93325 DOPPLER ECHO COLOR FLOW MAPG: CPT

## 2021-07-19 PROCEDURE — 92960 CARDIOVERSION ELECTRIC EXT: CPT

## 2021-07-19 PROCEDURE — 93312 ECHO TRANSESOPHAGEAL: CPT

## 2021-07-19 PROCEDURE — 80048 BASIC METABOLIC PNL TOTAL CA: CPT

## 2021-08-10 ENCOUNTER — HOSPITAL ENCOUNTER (OUTPATIENT)
Facility: MEDICAL CENTER | Age: 81
End: 2021-08-11
Attending: EMERGENCY MEDICINE | Admitting: INTERNAL MEDICINE
Payer: COMMERCIAL

## 2021-08-10 ENCOUNTER — APPOINTMENT (OUTPATIENT)
Dept: RADIOLOGY | Facility: MEDICAL CENTER | Age: 81
End: 2021-08-10
Attending: INTERNAL MEDICINE
Payer: COMMERCIAL

## 2021-08-10 ENCOUNTER — APPOINTMENT (OUTPATIENT)
Dept: RADIOLOGY | Facility: MEDICAL CENTER | Age: 81
End: 2021-08-10
Attending: EMERGENCY MEDICINE
Payer: COMMERCIAL

## 2021-08-10 ENCOUNTER — APPOINTMENT (OUTPATIENT)
Dept: CARDIOLOGY | Facility: MEDICAL CENTER | Age: 81
End: 2021-08-10
Attending: INTERNAL MEDICINE
Payer: COMMERCIAL

## 2021-08-10 DIAGNOSIS — R79.89 ELEVATED TROPONIN: ICD-10-CM

## 2021-08-10 DIAGNOSIS — R73.9 HYPERGLYCEMIA: ICD-10-CM

## 2021-08-10 DIAGNOSIS — R07.9 ACUTE CHEST PAIN: ICD-10-CM

## 2021-08-10 DIAGNOSIS — Z86.79 HISTORY OF CORONARY ARTERY DISEASE: ICD-10-CM

## 2021-08-10 PROBLEM — M54.2 NECK PAIN: Status: ACTIVE | Noted: 2021-08-10

## 2021-08-10 LAB
ALBUMIN SERPL BCP-MCNC: 3.7 G/DL (ref 3.2–4.9)
ALBUMIN/GLOB SERPL: 1.5 G/DL
ALP SERPL-CCNC: 59 U/L (ref 30–99)
ALT SERPL-CCNC: 21 U/L (ref 2–50)
ANION GAP SERPL CALC-SCNC: 11 MMOL/L (ref 7–16)
AST SERPL-CCNC: 21 U/L (ref 12–45)
BASOPHILS # BLD AUTO: 0.1 % (ref 0–1.8)
BASOPHILS # BLD: 0.01 K/UL (ref 0–0.12)
BILIRUB SERPL-MCNC: 0.5 MG/DL (ref 0.1–1.5)
BUN SERPL-MCNC: 30 MG/DL (ref 8–22)
CALCIUM SERPL-MCNC: 8.9 MG/DL (ref 8.4–10.2)
CHLORIDE SERPL-SCNC: 98 MMOL/L (ref 96–112)
CO2 SERPL-SCNC: 24 MMOL/L (ref 20–33)
CREAT SERPL-MCNC: 1.07 MG/DL (ref 0.5–1.4)
EKG IMPRESSION: NORMAL
EOSINOPHIL # BLD AUTO: 0.12 K/UL (ref 0–0.51)
EOSINOPHIL NFR BLD: 1.6 % (ref 0–6.9)
ERYTHROCYTE [DISTWIDTH] IN BLOOD BY AUTOMATED COUNT: 47.8 FL (ref 35.9–50)
GLOBULIN SER CALC-MCNC: 2.4 G/DL (ref 1.9–3.5)
GLUCOSE BLD-MCNC: 177 MG/DL (ref 65–99)
GLUCOSE SERPL-MCNC: 172 MG/DL (ref 65–99)
HCT VFR BLD AUTO: 40 % (ref 42–52)
HGB BLD-MCNC: 13.2 G/DL (ref 14–18)
IMM GRANULOCYTES # BLD AUTO: 0.05 K/UL (ref 0–0.11)
IMM GRANULOCYTES NFR BLD AUTO: 0.6 % (ref 0–0.9)
LV EJECT FRACT  99904: 55
LV EJECT FRACT MOD 2C 99903: 57.44
LV EJECT FRACT MOD 4C 99902: 55.33
LV EJECT FRACT MOD BP 99901: 54.02
LYMPHOCYTES # BLD AUTO: 0.9 K/UL (ref 1–4.8)
LYMPHOCYTES NFR BLD: 11.6 % (ref 22–41)
MCH RBC QN AUTO: 30.1 PG (ref 27–33)
MCHC RBC AUTO-ENTMCNC: 33 G/DL (ref 33.7–35.3)
MCV RBC AUTO: 91.3 FL (ref 81.4–97.8)
MONOCYTES # BLD AUTO: 0.85 K/UL (ref 0–0.85)
MONOCYTES NFR BLD AUTO: 11 % (ref 0–13.4)
NEUTROPHILS # BLD AUTO: 5.81 K/UL (ref 1.82–7.42)
NEUTROPHILS NFR BLD: 75.1 % (ref 44–72)
NRBC # BLD AUTO: 0 K/UL
NRBC BLD-RTO: 0 /100 WBC
PLATELET # BLD AUTO: 156 K/UL (ref 164–446)
PMV BLD AUTO: 9.5 FL (ref 9–12.9)
POTASSIUM SERPL-SCNC: 4.3 MMOL/L (ref 3.6–5.5)
PROT SERPL-MCNC: 6.1 G/DL (ref 6–8.2)
RBC # BLD AUTO: 4.38 M/UL (ref 4.7–6.1)
SODIUM SERPL-SCNC: 133 MMOL/L (ref 135–145)
TROPONIN T SERPL-MCNC: 17 NG/L (ref 6–19)
TROPONIN T SERPL-MCNC: 17 NG/L (ref 6–19)
TROPONIN T SERPL-MCNC: 21 NG/L (ref 6–19)
WBC # BLD AUTO: 7.7 K/UL (ref 4.8–10.8)

## 2021-08-10 PROCEDURE — 93005 ELECTROCARDIOGRAM TRACING: CPT | Performed by: EMERGENCY MEDICINE

## 2021-08-10 PROCEDURE — 93306 TTE W/DOPPLER COMPLETE: CPT

## 2021-08-10 PROCEDURE — 93005 ELECTROCARDIOGRAM TRACING: CPT | Performed by: INTERNAL MEDICINE

## 2021-08-10 PROCEDURE — 94760 N-INVAS EAR/PLS OXIMETRY 1: CPT

## 2021-08-10 PROCEDURE — 71045 X-RAY EXAM CHEST 1 VIEW: CPT

## 2021-08-10 PROCEDURE — 84484 ASSAY OF TROPONIN QUANT: CPT | Mod: 91

## 2021-08-10 PROCEDURE — 80053 COMPREHEN METABOLIC PANEL: CPT

## 2021-08-10 PROCEDURE — A9270 NON-COVERED ITEM OR SERVICE: HCPCS | Performed by: INTERNAL MEDICINE

## 2021-08-10 PROCEDURE — G0378 HOSPITAL OBSERVATION PER HR: HCPCS

## 2021-08-10 PROCEDURE — 82962 GLUCOSE BLOOD TEST: CPT

## 2021-08-10 PROCEDURE — 96372 THER/PROPH/DIAG INJ SC/IM: CPT

## 2021-08-10 PROCEDURE — 72040 X-RAY EXAM NECK SPINE 2-3 VW: CPT

## 2021-08-10 PROCEDURE — 93306 TTE W/DOPPLER COMPLETE: CPT | Mod: 26 | Performed by: INTERNAL MEDICINE

## 2021-08-10 PROCEDURE — 700102 HCHG RX REV CODE 250 W/ 637 OVERRIDE(OP): Performed by: INTERNAL MEDICINE

## 2021-08-10 PROCEDURE — 99220 PR INITIAL OBSERVATION CARE,LEVL III: CPT | Mod: AI | Performed by: INTERNAL MEDICINE

## 2021-08-10 PROCEDURE — 85025 COMPLETE CBC W/AUTO DIFF WBC: CPT

## 2021-08-10 RX ORDER — ONDANSETRON 2 MG/ML
4 INJECTION INTRAMUSCULAR; INTRAVENOUS EVERY 4 HOURS PRN
Status: DISCONTINUED | OUTPATIENT
Start: 2021-08-10 | End: 2021-08-11 | Stop reason: HOSPADM

## 2021-08-10 RX ORDER — POLYETHYLENE GLYCOL 3350 17 G/17G
1 POWDER, FOR SOLUTION ORAL
Status: DISCONTINUED | OUTPATIENT
Start: 2021-08-10 | End: 2021-08-11 | Stop reason: HOSPADM

## 2021-08-10 RX ORDER — TAMSULOSIN HYDROCHLORIDE 0.4 MG/1
0.4 CAPSULE ORAL
Status: DISCONTINUED | OUTPATIENT
Start: 2021-08-11 | End: 2021-08-11 | Stop reason: HOSPADM

## 2021-08-10 RX ORDER — TIZANIDINE 4 MG/1
4 TABLET ORAL EVERY 8 HOURS PRN
Status: SHIPPED | COMMUNITY
End: 2021-08-10

## 2021-08-10 RX ORDER — LISINOPRIL 20 MG/1
20 TABLET ORAL DAILY
Status: DISCONTINUED | OUTPATIENT
Start: 2021-08-11 | End: 2021-08-11 | Stop reason: HOSPADM

## 2021-08-10 RX ORDER — FENOFIBRATE 134 MG/1
134 CAPSULE ORAL EVERY EVENING
Status: DISCONTINUED | OUTPATIENT
Start: 2021-08-10 | End: 2021-08-11 | Stop reason: HOSPADM

## 2021-08-10 RX ORDER — CYANOCOBALAMIN (VITAMIN B-12) 1000 MCG
1000 TABLET ORAL DAILY
COMMUNITY

## 2021-08-10 RX ORDER — TRAMADOL HYDROCHLORIDE 50 MG/1
50 TABLET ORAL EVERY 6 HOURS PRN
Status: DISCONTINUED | OUTPATIENT
Start: 2021-08-10 | End: 2021-08-11 | Stop reason: HOSPADM

## 2021-08-10 RX ORDER — AMOXICILLIN 250 MG
2 CAPSULE ORAL 2 TIMES DAILY
Status: DISCONTINUED | OUTPATIENT
Start: 2021-08-10 | End: 2021-08-11 | Stop reason: HOSPADM

## 2021-08-10 RX ORDER — DEXTROSE MONOHYDRATE 25 G/50ML
50 INJECTION, SOLUTION INTRAVENOUS
Status: DISCONTINUED | OUTPATIENT
Start: 2021-08-10 | End: 2021-08-11 | Stop reason: HOSPADM

## 2021-08-10 RX ORDER — PREDNISONE 20 MG/1
40 TABLET ORAL DAILY
COMMUNITY

## 2021-08-10 RX ORDER — ALLOPURINOL 300 MG/1
300 TABLET ORAL DAILY
COMMUNITY

## 2021-08-10 RX ORDER — FENOFIBRATE 160 MG/1
160 TABLET ORAL EVERY EVENING
COMMUNITY

## 2021-08-10 RX ORDER — CLOPIDOGREL BISULFATE 75 MG/1
75 TABLET ORAL DAILY
Status: DISCONTINUED | OUTPATIENT
Start: 2021-08-11 | End: 2021-08-11 | Stop reason: HOSPADM

## 2021-08-10 RX ORDER — GABAPENTIN 400 MG/1
400 CAPSULE ORAL 4 TIMES DAILY
Status: DISCONTINUED | OUTPATIENT
Start: 2021-08-10 | End: 2021-08-11 | Stop reason: HOSPADM

## 2021-08-10 RX ORDER — MAGNESIUM OXIDE 420 MG/1
420 TABLET ORAL 3 TIMES DAILY
COMMUNITY

## 2021-08-10 RX ORDER — CETIRIZINE HYDROCHLORIDE 10 MG/1
10 TABLET ORAL DAILY
COMMUNITY

## 2021-08-10 RX ORDER — FENOFIBRATE 160 MG/1
160 TABLET ORAL EVERY EVENING
Status: DISCONTINUED | OUTPATIENT
Start: 2021-08-10 | End: 2021-08-10

## 2021-08-10 RX ORDER — ONDANSETRON 4 MG/1
4 TABLET, ORALLY DISINTEGRATING ORAL EVERY 4 HOURS PRN
Status: DISCONTINUED | OUTPATIENT
Start: 2021-08-10 | End: 2021-08-11 | Stop reason: HOSPADM

## 2021-08-10 RX ORDER — BISACODYL 10 MG
10 SUPPOSITORY, RECTAL RECTAL
Status: DISCONTINUED | OUTPATIENT
Start: 2021-08-10 | End: 2021-08-11 | Stop reason: HOSPADM

## 2021-08-10 RX ORDER — IPRATROPIUM BROMIDE 21 UG/1
2 SPRAY, METERED NASAL EVERY 6 HOURS PRN
COMMUNITY

## 2021-08-10 RX ORDER — CYCLOBENZAPRINE HCL 10 MG
10 TABLET ORAL 3 TIMES DAILY PRN
Status: DISCONTINUED | OUTPATIENT
Start: 2021-08-10 | End: 2021-08-11 | Stop reason: HOSPADM

## 2021-08-10 RX ORDER — ATORVASTATIN CALCIUM 40 MG/1
40 TABLET, FILM COATED ORAL EVERY EVENING
Status: DISCONTINUED | OUTPATIENT
Start: 2021-08-10 | End: 2021-08-11 | Stop reason: HOSPADM

## 2021-08-10 RX ORDER — CARVEDILOL 3.12 MG/1
3.12 TABLET ORAL 2 TIMES DAILY WITH MEALS
Status: DISCONTINUED | OUTPATIENT
Start: 2021-08-10 | End: 2021-08-11 | Stop reason: HOSPADM

## 2021-08-10 RX ORDER — TRAMADOL HYDROCHLORIDE 50 MG/1
50 TABLET ORAL EVERY 6 HOURS PRN
COMMUNITY

## 2021-08-10 RX ORDER — TAMSULOSIN HYDROCHLORIDE 0.4 MG/1
0.4 CAPSULE ORAL
COMMUNITY

## 2021-08-10 RX ORDER — NITROGLYCERIN 0.4 MG/1
0.4 TABLET SUBLINGUAL
Status: DISCONTINUED | OUTPATIENT
Start: 2021-08-10 | End: 2021-08-11 | Stop reason: HOSPADM

## 2021-08-10 RX ORDER — ALLOPURINOL 300 MG/1
300 TABLET ORAL DAILY
Status: DISCONTINUED | OUTPATIENT
Start: 2021-08-11 | End: 2021-08-11 | Stop reason: HOSPADM

## 2021-08-10 RX ORDER — LISINOPRIL 20 MG/1
20 TABLET ORAL DAILY
COMMUNITY

## 2021-08-10 RX ORDER — GABAPENTIN 400 MG/1
400 CAPSULE ORAL 4 TIMES DAILY
COMMUNITY

## 2021-08-10 RX ORDER — LIDOCAINE 50 MG/G
1 PATCH TOPICAL PRN
COMMUNITY

## 2021-08-10 RX ORDER — OMEPRAZOLE 20 MG/1
20 CAPSULE, DELAYED RELEASE ORAL DAILY
Status: DISCONTINUED | OUTPATIENT
Start: 2021-08-11 | End: 2021-08-11 | Stop reason: HOSPADM

## 2021-08-10 RX ORDER — CETIRIZINE HYDROCHLORIDE 10 MG/1
10 TABLET ORAL DAILY
Status: DISCONTINUED | OUTPATIENT
Start: 2021-08-11 | End: 2021-08-11 | Stop reason: HOSPADM

## 2021-08-10 RX ADMIN — APIXABAN 5 MG: 5 TABLET, FILM COATED ORAL at 21:06

## 2021-08-10 RX ADMIN — GABAPENTIN 400 MG: 400 CAPSULE ORAL at 21:05

## 2021-08-10 RX ADMIN — ATORVASTATIN CALCIUM 40 MG: 40 TABLET, FILM COATED ORAL at 21:05

## 2021-08-10 RX ADMIN — CARVEDILOL 3.12 MG: 3.12 TABLET, FILM COATED ORAL at 21:04

## 2021-08-10 RX ADMIN — INSULIN HUMAN 1 UNITS: 100 INJECTION, SOLUTION PARENTERAL at 21:15

## 2021-08-10 RX ADMIN — FENOFIBRATE 134 MG: 134 CAPSULE ORAL at 21:05

## 2021-08-10 ASSESSMENT — COGNITIVE AND FUNCTIONAL STATUS - GENERAL
DRESSING REGULAR LOWER BODY CLOTHING: A LITTLE
CLIMB 3 TO 5 STEPS WITH RAILING: A LITTLE
MOVING FROM LYING ON BACK TO SITTING ON SIDE OF FLAT BED: A LITTLE
SUGGESTED CMS G CODE MODIFIER DAILY ACTIVITY: CJ
SUGGESTED CMS G CODE MODIFIER MOBILITY: CJ
HELP NEEDED FOR BATHING: A LITTLE
MOBILITY SCORE: 21
MOVING TO AND FROM BED TO CHAIR: A LITTLE
DAILY ACTIVITIY SCORE: 22

## 2021-08-10 ASSESSMENT — PATIENT HEALTH QUESTIONNAIRE - PHQ9
1. LITTLE INTEREST OR PLEASURE IN DOING THINGS: NOT AT ALL
2. FEELING DOWN, DEPRESSED, IRRITABLE, OR HOPELESS: NOT AT ALL
SUM OF ALL RESPONSES TO PHQ9 QUESTIONS 1 AND 2: 0

## 2021-08-10 ASSESSMENT — LIFESTYLE VARIABLES
HAVE PEOPLE ANNOYED YOU BY CRITICIZING YOUR DRINKING: NO
EVER HAD A DRINK FIRST THING IN THE MORNING TO STEADY YOUR NERVES TO GET RID OF A HANGOVER: NO
TOTAL SCORE: 0
HAVE YOU EVER FELT YOU SHOULD CUT DOWN ON YOUR DRINKING: NO
CONSUMPTION TOTAL: NEGATIVE
AVERAGE NUMBER OF DAYS PER WEEK YOU HAVE A DRINK CONTAINING ALCOHOL: 7
TOTAL SCORE: 0
EVER FELT BAD OR GUILTY ABOUT YOUR DRINKING: NO
TOTAL SCORE: 0
ALCOHOL_USE: YES
ON A TYPICAL DAY WHEN YOU DRINK ALCOHOL HOW MANY DRINKS DO YOU HAVE: 1
HOW MANY TIMES IN THE PAST YEAR HAVE YOU HAD 5 OR MORE DRINKS IN A DAY: 0

## 2021-08-10 ASSESSMENT — HEART SCORE
TROPONIN: 1-3 TIMES NORMAL LIMIT
ECG: NON-SPECIFIC REPOLARIZATION DISTURBANCE
RISK FACTORS: >2 RISK FACTORS OR HX OF ATHEROSCLEROTIC DISEASE
AGE: 65+
HISTORY: MODERATELY SUSPICIOUS
HEART SCORE: 7

## 2021-08-10 ASSESSMENT — CHA2DS2 SCORE
AGE 75 OR GREATER: YES
CHF OR LEFT VENTRICULAR DYSFUNCTION: NO
AGE 65 TO 74: NO
CHA2DS2 VASC SCORE: 5
SEX: MALE
HYPERTENSION: YES
VASCULAR DISEASE: YES
PRIOR STROKE OR TIA OR THROMBOEMBOLISM: NO
DIABETES: YES

## 2021-08-10 ASSESSMENT — ENCOUNTER SYMPTOMS
MYALGIAS: 1
NECK PAIN: 1
DIARRHEA: 0
DIAPHORESIS: 1
ABDOMINAL PAIN: 0
ORTHOPNEA: 0
COUGH: 0
NAUSEA: 1
VOMITING: 0
DIZZINESS: 1
SHORTNESS OF BREATH: 1

## 2021-08-10 ASSESSMENT — PAIN DESCRIPTION - PAIN TYPE: TYPE: ACUTE PAIN

## 2021-08-10 NOTE — H&P
Hospital Medicine History & Physical Note    Date of Service  8/10/2021    Primary Care Physician  Leland Hines Jr., M.D.    Consultants  none      Code Status  DNR    Chief Complaint  Chief Complaint   Patient presents with   • Chest Pain     rt sided x 3-4 days   • Shoulder Pain     left x 3-4 days       History of Presenting Illness  Jamarcus Flores is a 81 y.o. male who presented 8/10/2021 with chest pain. He has PMH DM, prostate cancer with met to spine, afib on Eliquis, MI with bypass in 1998 and 9 stents with last stenting 7 years ago. He is followed by [facility] cardiology and had routine stress test last year that he reports was normal.  He woke up couple times last night with chest pain and pain was very severe this morning while in bed. Radiates to his neck. Associated with dizziness, nausea, and diaphoresis and SOB. It feels similar to prior MI. Pain does feel better leaning forward, feels worse with deep breath. He also has hardware in his cervical spine from years prior, he's been having bilateral scapula pains and he's not sure if this pain is related to his chest. He's not very active but denies having chest pain prior to today in recent months. He took an aspirin today. His chest pain has subsided now.    I discussed the plan of care with patient and ERP.    Review of Systems  Review of Systems   Constitutional: Positive for diaphoresis.   Respiratory: Positive for shortness of breath. Negative for cough.    Cardiovascular: Positive for chest pain. Negative for orthopnea and leg swelling.   Gastrointestinal: Positive for nausea. Negative for abdominal pain, diarrhea and vomiting.   Genitourinary: Negative for dysuria.   Musculoskeletal: Positive for myalgias and neck pain.   Neurological: Positive for dizziness.   All other systems reviewed and are negative.      Past Medical History   has a past medical history of CAD (coronary artery disease), Chronic airway obstruction, not elsewhere  classified, Coronary atherosclerosis of unspecified type of vessel, native or graft, Diverticulitis, GERD (gastroesophageal reflux disease), Gout, Hypertension, Other and unspecified hyperlipidemia, Prostate cancer, primary, with metastasis from prostate to other site (HCC), Pulmonary nodule, Thrombocytopenia (HCC), and Type II or unspecified type diabetes mellitus without mention of complication, not stated as uncontrolled.    Surgical History   has a past surgical history that includes prostate needle biopsy (09/18/2018); other; and other.     Family History  family history includes Cancer in his brother and father.       Social History   reports that he has quit smoking. His smoking use included cigarettes. He has quit using smokeless tobacco. He reports current alcohol use. He reports that he does not use drugs.    Allergies  Allergies   Allergen Reactions   • Penicillins      Pt reports that its a childhood allergie, not sure what happens        Medications  Prior to Admission Medications   Prescriptions Last Dose Informant Patient Reported? Taking?   Cyanocobalamin (B-12) 1000 MCG Tab 8/10/2021 at 0830 Patient's Home Pharmacy Yes Yes   Sig: Take 1,000 mcg by mouth every day.   ELIQUIS 5 MG Tab 8/10/2021 at 0830 Patient's Home Pharmacy Yes No   Sig: Take 5 mg by mouth 2 times a day.   Magnesium Oxide 420 MG Tab 8/10/2021 at 0830 Patient's Home Pharmacy Yes Yes   Sig: Take 420 mg by mouth in the morning, at noon, and at bedtime.   Misc Natural Products (GLUCOSAMINE CHOND COMPLEX/MSM PO) 8/10/2021 at 0830 Patient Yes No   Sig: Take 2 Tabs by mouth every day.   Multiple Vitamins-Minerals (MULTIVITAMIN PO) 8/10/2021 at 0830 Patient Yes No   Sig: Take 1 Tab by mouth every day.   allopurinol (ZYLOPRIM) 300 MG Tab 8/10/2021 at 0830 Patient's Home Pharmacy Yes Yes   Sig: Take 300 mg by mouth every day.   artificial tears (EYE LUBRICANT) Ointment ophth ointment > 2 weeks at Unknown Patient's Home Pharmacy Yes Yes   Sig:  Apply 1 Application to both eyes as needed (For dry eye).   atorvastatin (LIPITOR) 40 MG Tab 8/9/2021 at 2100 Patient's Home Pharmacy Yes No   Sig: Take 40 mg by mouth every evening.   carvedilol (COREG) 3.125 MG Tab 8/10/2021 at 0830 Patient's Home Pharmacy Yes No   Sig: Take 3.125 mg by mouth 2 times a day with meals.   cetirizine (ZYRTEC) 10 MG Tab 8/10/2021 at 0830 Patient's Home Pharmacy Yes Yes   Sig: Take 10 mg by mouth every day.   clopidogrel (PLAVIX) 75 MG TABS 8/10/2021 at 0830 Patient's Home Pharmacy No No   Sig: Take 1 Tab by mouth every day.   fenofibrate (TRIGLIDE) 160 MG tablet 8/9/2021 at 2100 Patient's Home Pharmacy Yes Yes   Sig: Take 160 mg by mouth every evening.   gabapentin (NEURONTIN) 400 MG Cap 8/10/2021 at 0830 Patient's Home Pharmacy Yes Yes   Sig: Take 400 mg by mouth 4 times a day.   ipratropium (ATROVENT) 0.03 % Solution > 2 weeks at Unknown Patient's Home Pharmacy Yes Yes   Sig: Administer 2 Sprays into affected nostril(S) every 6 hours as needed (For congestion).   lidocaine (LIDODERM) 5 % Patch 8/9/2021 at 0800 Patient's Home Pharmacy Yes Yes   Sig: Place 1 Patch on the skin as needed (Apply's on back, shoulders, and neck).   lisinopril (PRINIVIL) 20 MG Tab 8/10/2021 at 0830 Patient's Home Pharmacy Yes Yes   Sig: Take 20 mg by mouth every day.   metFORMIN (GLUCOPHAGE) 500 MG Tab 8/10/2021 at 0830 Patient's Home Pharmacy Yes Yes   Sig: Take 500 mg by mouth 2 times a day with meals.   nitroglycerin (NITROSTAT) 0.4 MG SUBL 8/10/2021 at 0900 Patient's Home Pharmacy No No   Sig: Place 1 Tab under tongue as needed for Chest Pain (every 5 minutes up to 3 doses in 15 minutes.).   pantoprazole (PROTONIX) 40 MG TBEC 8/10/2021 at 0830 Patient's Home Pharmacy No No   Sig: Take 1 Tab by mouth every day. Indications: Gastroesophageal Reflux Disease   predniSONE (DELTASONE) 20 MG Tab 8/9/2021 at FINISHED Patient's Home Pharmacy Yes Yes   Sig: Take 40 mg by mouth every day. Pt started on 8/3/2021  for 5 day course   tamsulosin (FLOMAX) 0.4 MG capsule 8/10/2021 at 0830 Patient's Home Pharmacy Yes Yes   Sig: Take 0.4 mg by mouth 1/2 hour after breakfast.   traMADol (ULTRAM) 50 MG Tab 8/9/2021 at 2100 Patient's Home Pharmacy Yes Yes   Sig: Take 50 mg by mouth every 6 hours as needed for Moderate Pain.      Facility-Administered Medications: None       Physical Exam  Temp:  [35.6 °C (96 °F)] 35.6 °C (96 °F)  Pulse:  [66-83] 66  Resp:  [13-20] 16  BP: ()/(54-62) 115/62  SpO2:  [88 %-97 %] 94 %    Physical Exam  Vitals and nursing note reviewed.   Constitutional:       General: He is not in acute distress.     Appearance: He is not toxic-appearing.   HENT:      Head: Normocephalic.      Mouth/Throat:      Mouth: Mucous membranes are moist.   Eyes:      General:         Right eye: No discharge.         Left eye: No discharge.   Neck:      Comments: Tenderness to paraspinal neck  Cardiovascular:      Rate and Rhythm: Normal rate and regular rhythm.   Pulmonary:      Effort: Pulmonary effort is normal. No respiratory distress.      Breath sounds: No wheezing or rales.   Chest:      Chest wall: No tenderness.   Abdominal:      Palpations: Abdomen is soft.      Tenderness: There is no abdominal tenderness. There is no guarding or rebound.   Musculoskeletal:         General: No swelling.      Cervical back: Neck supple.   Skin:     General: Skin is warm and dry.   Neurological:      General: No focal deficit present.      Mental Status: He is alert.      Comments: AOx4         Laboratory:  Recent Labs     08/10/21  1340   WBC 7.7   RBC 4.38*   HEMOGLOBIN 13.2*   HEMATOCRIT 40.0*   MCV 91.3   MCH 30.1   MCHC 33.0*   RDW 47.8   PLATELETCT 156*   MPV 9.5     Recent Labs     08/10/21  1340   SODIUM 133*   POTASSIUM 4.3   CHLORIDE 98   CO2 24   GLUCOSE 172*   BUN 30*   CREATININE 1.07   CALCIUM 8.9     Recent Labs     08/10/21  1340   ALTSGPT 21   ASTSGOT 21   ALKPHOSPHAT 59   TBILIRUBIN 0.5   GLUCOSE 172*         No  results for input(s): NTPROBNP in the last 72 hours.      Recent Labs     08/10/21  1340   TROPONINT 21*       Imaging:  DX-CHEST-PORTABLE (1 VIEW)   Final Result      1.  Mild bibasilar atelectasis.      2.  Focal calcification at the rotator cuff insertion on the left, consistent with calcific tendinitis in the appropriate clinical setting      DX-CERVICAL SPINE-2 OR 3 VIEWS    (Results Pending)   EC-ECHOCARDIOGRAM COMPLETE W/O CONT    (Results Pending)     DX-CHEST-PORTABLE (1 VIEW)   Final Result      1.  Mild bibasilar atelectasis.      2.  Focal calcification at the rotator cuff insertion on the left, consistent with calcific tendinitis in the appropriate clinical setting      DX-CERVICAL SPINE-2 OR 3 VIEWS    (Results Pending)   EC-ECHOCARDIOGRAM COMPLETE W/O CONT    (Results Pending)         Assessment/Plan:  I anticipate this patient is appropriate for observation status at this time.    * Other chest pain- (present on admission)  Assessment & Plan  History of CABG and stenting, last stenting 7 years ago  Had reported normal nuc med stress test last year per patient, less likely ACS. 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 years ago  Flexeril and tramadol PRN  XR c-spine ordered    S/P CABG x 2- (present on admission)  Assessment & Plan  CABG in 1998, followed by [facility] cardiology, records requested    GERD (gastroesophageal reflux disease)- (present on admission)  Assessment & Plan  Continue PPI    Type 2 diabetes mellitus without complication, without long-term current use of insulin (HCC)- (present on admission)  Assessment & Plan  Hold metformin  ISS    Essential hypertension, benign- (present on admission)  Assessment & Plan  Stable  Continue coreg and lisinopril      VTE prophylaxis: therapeutic  anticoagulation with Eliquis

## 2021-08-10 NOTE — ED NOTES
Pt to er via [facility] with c/o left shoulder pain and rt sided chest pain while working in yard today unrelieved with 1 ntg. ekg by same w/o ischemia, recvd [facility] 324 enroute, took ntg at home w/o relief. Chest pain protocol iniitiated, - covid vaccinations. rm air sat=88-91 % placed on 2L n/c for same

## 2021-08-10 NOTE — ED NOTES
Med rec updated and complete  Allergies reviewed  Pt is not sure all of the names and strengths of his medications.  [facility] @ [phone] to [facility], per pt reports that he is not taking TIZANIDINE 4MG or METAMUCIL   Called [facility] @ [phone] to verify all medications   Per pt reports that he takes PLAVIX 75MG and  ELIQUIS 5MG  Pts pharmacy's reports no antibiotics in the last 30 days.    No current facility-administered medications on file prior to encounter.     Current Outpatient Medications on File Prior to Encounter   Medication Sig Dispense Refill   • allopurinol (ZYLOPRIM) 300 MG Tab Take 300 mg by mouth every day.     • artificial tears (EYE LUBRICANT) Ointment ophth ointment Apply 1 Application to both eyes as needed (For dry eye).     • cetirizine (ZYRTEC) 10 MG Tab Take 10 mg by mouth every day.     • Cyanocobalamin (B-12) 1000 MCG Tab Take 1,000 mcg by mouth every day.     • gabapentin (NEURONTIN) 400 MG Cap Take 400 mg by mouth 4 times a day.     • ipratropium (ATROVENT) 0.03 % Solution Administer 2 Sprays into affected nostril(S) every 6 hours as needed (For congestion).     • Magnesium Oxide 420 MG Tab Take 420 mg by mouth in the morning, at noon, and at bedtime.     • metFORMIN (GLUCOPHAGE) 500 MG Tab Take 500 mg by mouth 2 times a day with meals.     • predniSONE (DELTASONE) 20 MG Tab Take 40 mg by mouth every day. Pt started on 8/3/2021 for 5 day course     • tamsulosin (FLOMAX) 0.4 MG capsule Take 0.4 mg by mouth 1/2 hour after breakfast.     • traMADol (ULTRAM) 50 MG Tab Take 50 mg by mouth every 6 hours as needed for Moderate Pain.     • lidocaine (LIDODERM) 5 % Patch Place 1 Patch on the skin as needed (Apply's on back, shoulders, and neck).     • fenofibrate (TRIGLIDE) 160 MG tablet Take 160 mg by mouth every evening.     • lisinopril (PRINIVIL) 20 MG Tab Take 20 mg by mouth every day.     • ELIQUIS 5 MG Tab Take 5 mg by mouth 2 times a day.  5   • atorvastatin (LIPITOR) 40 MG  Tab Take 40 mg by mouth every evening.  3   • carvedilol (COREG) 3.125 MG Tab Take 3.125 mg by mouth 2 times a day with meals.  0   • nitroglycerin (NITROSTAT) 0.4 MG SUBL Place 1 Tab under tongue as needed for Chest Pain (every 5 minutes up to 3 doses in 15 minutes.). 25 Tab 2   • pantoprazole (PROTONIX) 40 MG TBEC Take 1 Tab by mouth every day. Indications: Gastroesophageal Reflux Disease 90 Tab 3   • clopidogrel (PLAVIX) 75 MG TABS Take 1 Tab by mouth every day. 90 Tab 3   • Misc Natural Products (GLUCOSAMINE CHOND COMPLEX/MSM PO) Take 2 Tabs by mouth every day.     • Multiple Vitamins-Minerals (MULTIVITAMIN PO) Take 1 Tab by mouth every day.

## 2021-08-10 NOTE — ED PROVIDER NOTES
ED Provider Note    Scribed for Mark Kingsley M.D. by Sammy Keita. 8/10/2021  12:52 PM    Primary care provider: Leland Hines Jr., M.D.  Means of arrival: EMS  History obtained from: Patient  History limited by: None    CHIEF COMPLAINT  Chief Complaint   Patient presents with   • Chest Pain     rt sided x 3-4 days   • Shoulder Pain     left x 3-4 days       HPI  Jamarcus Flores is a 81 y.o. male who presents to the Emergency Department via EMS for acute right sided chest pain. Patient states he was working in the yard today when he began to experience sudden right sided chest pain alongside some pain to his left shoulder. 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 Patient was treated with 324 ASA and he took Nitro at home which provided moderate relief. Upon arrival he reports his pain has improved to 2-3/10 in severity. His O2 saturation was in the low 90's/high 80's for EMS and he was placed on 2L NC. Patient otherwise has no other acute medical complaints at this time. He did not get vaccinated for COVID.     REVIEW OF SYSTEMS  Pertinent positives include right chest pain and left shoulder pain.   All other systems reviewed and negative. See HPI for further details.       PAST MEDICAL HISTORY   has a past medical history of CAD (coronary artery disease), Chronic airway obstruction, not elsewhere classified, Coronary atherosclerosis of unspecified type of vessel, native or graft, Diverticulitis, GERD (gastroesophageal reflux disease), Gout, Hypertension, Other and unspecified hyperlipidemia, Prostate cancer, primary, with metastasis from prostate to other site (HCC), Pulmonary nodule, Thrombocytopenia (HCC), and Type II or unspecified type diabetes mellitus without mention of complication, not stated as uncontrolled.    SURGICAL HISTORY   has a past surgical history that includes prostate needle biopsy (09/18/2018); other; and  other.    SOCIAL HISTORY  Social History     Tobacco Use   • Smoking status: Former Smoker     Types: Cigarettes   • Smokeless tobacco: Former User   • Tobacco comment: quit 25years ago   Vaping Use   • Vaping Use: Never used   Substance Use Topics   • Alcohol use: Yes     Comment: 6 beers week/[place] cream in coffee every am   • Drug use: No      Social History     Substance and Sexual Activity   Drug Use No       FAMILY HISTORY  Family History   Problem Relation Age of Onset   • Cancer Brother         Prostate/lung cancer   • Cancer Father         Melanoma       CURRENT MEDICATIONS  Home Medications     Reviewed by Kelvin Worthington (Pharmacy Tech) on 08/10/21 at 1413  Med List Status: Complete   Medication Last Dose Status   allopurinol (ZYLOPRIM) 300 MG Tab 8/10/2021 Active   artificial tears (EYE LUBRICANT) Ointment ophth ointment > 2 weeks Active   atorvastatin (LIPITOR) 40 MG Tab 8/9/2021 Active   carvedilol (COREG) 3.125 MG Tab 8/10/2021 Active   cetirizine (ZYRTEC) 10 MG Tab 8/10/2021 Active   clopidogrel (PLAVIX) 75 MG TABS 8/10/2021 Active   Cyanocobalamin (B-12) 1000 MCG Tab 8/10/2021 Active   ELIQUIS 5 MG Tab 8/10/2021 Active   fenofibrate (TRIGLIDE) 160 MG tablet 8/9/2021 Active   gabapentin (NEURONTIN) 400 MG Cap 8/10/2021 Active   ipratropium (ATROVENT) 0.03 % Solution > 2 weeks Active   lidocaine (LIDODERM) 5 % Patch 8/9/2021 Active   lisinopril (PRINIVIL) 20 MG Tab 8/10/2021 Active   Magnesium Oxide 420 MG Tab 8/10/2021 Active   metFORMIN (GLUCOPHAGE) 500 MG Tab 8/10/2021 Active   Misc Natural Products (GLUCOSAMINE CHOND COMPLEX/MSM PO) 8/10/2021 Active   Multiple Vitamins-Minerals (MULTIVITAMIN PO) 8/10/2021 Active   nitroglycerin (NITROSTAT) 0.4 MG SUBL 8/10/2021 Active   pantoprazole (PROTONIX) 40 MG TBEC 8/10/2021 Active   predniSONE (DELTASONE) 20 MG Tab 8/9/2021 Active   tamsulosin (FLOMAX) 0.4 MG capsule 8/10/2021 Active   traMADol (ULTRAM) 50 MG Tab 8/9/2021 Active           "      ALLERGIES  Allergies   Allergen Reactions   • Penicillins        PHYSICAL EXAM  VITAL SIGNS: BP (!) 95/55   Pulse 81   Temp (!) 35.6 °C (96 °F) (Temporal)   Resp 20   Ht 1.664 m (5' 5.5\")   Wt 72.6 kg (160 lb) Comment: states, bib remsa  SpO2 88%   BMI 26.22 kg/m²     Nursing note and vitals reviewed.  Constitutional: Well-developed and well-nourished. No distress.   HENT: Head is normocephalic and atraumatic. Oropharynx is clear and moist without exudate or erythema.   Eyes: Pupils are equal, round, and reactive to light. Conjunctiva are normal.   Cardiovascular: Normal rate and regular rhythm. No murmur heard. Normal radial pulses.   Pulmonary/Chest: Breath sounds normal. No wheezes or rales. No chest wall tenderness.   Abdominal: Soft and non-tender. No distention   Musculoskeletal: Extremities exhibit normal range of motion without edema or tenderness. No calf tenderness or palpable cords.   Neurological: Awake, alert and oriented to person, place, and time. No focal deficits noted.  Skin: Skin is warm and dry. No rash.   Psychiatric: Normal mood and affect. Appropriate for clinical situation    DIAGNOSTIC STUDIES / PROCEDURES    EKG Interpretation  Interpreted by me as below    LABS  Results for orders placed or performed during the hospital encounter of 08/10/21   CBC with Differential   Result Value Ref Range    WBC 7.7 4.8 - 10.8 K/uL    RBC 4.38 (L) 4.70 - 6.10 M/uL    Hemoglobin 13.2 (L) 14.0 - 18.0 g/dL    Hematocrit 40.0 (L) 42.0 - 52.0 %    MCV 91.3 81.4 - 97.8 fL    MCH 30.1 27.0 - 33.0 pg    MCHC 33.0 (L) 33.7 - 35.3 g/dL    RDW 47.8 35.9 - 50.0 fL    Platelet Count 156 (L) 164 - 446 K/uL    MPV 9.5 9.0 - 12.9 fL    Neutrophils-Polys 75.10 (H) 44.00 - 72.00 %    Lymphocytes 11.60 (L) 22.00 - 41.00 %    Monocytes 11.00 0.00 - 13.40 %    Eosinophils 1.60 0.00 - 6.90 %    Basophils 0.10 0.00 - 1.80 %    Immature Granulocytes 0.60 0.00 - 0.90 %    Nucleated RBC 0.00 /100 WBC    Neutrophils " (Absolute) 5.81 1.82 - 7.42 K/uL    Lymphs (Absolute) 0.90 (L) 1.00 - 4.80 K/uL    Monos (Absolute) 0.85 0.00 - 0.85 K/uL    Eos (Absolute) 0.12 0.00 - 0.51 K/uL    Baso (Absolute) 0.01 0.00 - 0.12 K/uL    Immature Granulocytes (abs) 0.05 0.00 - 0.11 K/uL    NRBC (Absolute) 0.00 K/uL   Complete Metabolic Panel (CMP)   Result Value Ref Range    Sodium 133 (L) 135 - 145 mmol/L    Potassium 4.3 3.6 - 5.5 mmol/L    Chloride 98 96 - 112 mmol/L    Co2 24 20 - 33 mmol/L    Anion Gap 11.0 7.0 - 16.0    Glucose 172 (H) 65 - 99 mg/dL    Bun 30 (H) 8 - 22 mg/dL    Creatinine 1.07 0.50 - 1.40 mg/dL    Calcium 8.9 8.4 - 10.2 mg/dL    AST(SGOT) 21 12 - 45 U/L    ALT(SGPT) 21 2 - 50 U/L    Alkaline Phosphatase 59 30 - 99 U/L    Total Bilirubin 0.5 0.1 - 1.5 mg/dL    Albumin 3.7 3.2 - 4.9 g/dL    Total Protein 6.1 6.0 - 8.2 g/dL    Globulin 2.4 1.9 - 3.5 g/dL    A-G Ratio 1.5 g/dL   Troponin   Result Value Ref Range    Troponin T 21 (H) 6 - 19 ng/L   ESTIMATED GFR   Result Value Ref Range    GFR If African American >60 >60 mL/min/1.73 m 2    GFR If Non African American >60 >60 mL/min/1.73 m 2   EKG   Result Value Ref Range    Report       [facility].    Test Date:  2021-08-10  Pt Name:    SANDHYA VÁSQUEZ                  Department: EDSM  MRN:        [id number]                      Room:       -ROOM 2  Gender:     Male                         Technician: PEGGY  :        [id number]                   Requested By:ER TRIAGE PROTOCOL  Order #:    [id number]                    Reading MD: FAHAD MUNOZ MD    Measurements  Intervals                                Axis  Rate:       75                           P:          68  WA:         171                          QRS:        76  QRSD:       93                           T:          20  QT:         395  QTc:        442    Interpretive Statements  Sinus rhythm  T wave flattening in 2, 3, aVF  No previous ECG available for comparison  Nonspecific ST  and T wave changes.  Electronically Signed On 8- 14:22:21 PDT by FAHAD MUNOZ MD       All labs reviewed by me.    RADIOLOGY  DX-CHEST-PORTABLE (1 VIEW)    (Results Pending)     The radiologist's interpretation of all radiological studies have been reviewed by me.    COURSE & MEDICAL DECISION MAKING  Nursing notes, VS, PMSFHx reviewed in chart.       12:52 PM - Patient seen and examined at bedside. Ordered DX-chest, CBC w/ diff, CMP, Troponin, and EKG to evaluate his symptoms. The differential diagnoses include but are not limited to: Acute coronary syndrome, pneumonia, GERD    2:23 PM laboratory studies remarkable for minimally elevated troponin.  Otherwise no remarkable abnormality.  Chest x-ray is unrevealing.  EKG demonstrates nonspecific ST and T wave changes.  Patient's heart score is 7.  He will be admitted to the hospital for further evaluation      FINAL IMPRESSION  1. Acute chest pain    2. History of coronary artery disease    3. Elevated troponin    4. Hyperglycemia          I, Sammy Keita (Scribe), am scribing for, and in the presence of, Fahad Munoz M.D..    Electronically signed by: Sammy Keita (Herminiaibvasu), 8/10/2021    Fhaad WHITAKER M.D. personally performed the services described in this documentation, as scribed by Sammy Keita in my presence, and it is both accurate and complete.    The note accurately reflects work and decisions made by me.  Fahad Munoz M.D.  8/10/2021  2:24 PM

## 2021-08-10 NOTE — ASSESSMENT & PLAN NOTE
History of CABG and stenting, last stenting 7 years ago  Had reported normal nuc med stress test last year per patient, less likely ACS. But will request records from [facility]  He had not had recent TTE, ordered  Monitor on telemetry  Continue plavix, Eliquis, statin  Trend troponin and EKG

## 2021-08-10 NOTE — ED NOTES
Attempted to call report told the bed is a terminal clean and no RN assigned at this time.  Charge RN aware

## 2021-08-11 ENCOUNTER — PATIENT OUTREACH (OUTPATIENT)
Dept: HEALTH INFORMATION MANAGEMENT | Facility: OTHER | Age: 81
End: 2021-08-11

## 2021-08-11 VITALS
OXYGEN SATURATION: 95 % | HEIGHT: 66 IN | SYSTOLIC BLOOD PRESSURE: 129 MMHG | RESPIRATION RATE: 16 BRPM | TEMPERATURE: 97.9 F | DIASTOLIC BLOOD PRESSURE: 70 MMHG | WEIGHT: 164.9 LBS | HEART RATE: 75 BPM | BODY MASS INDEX: 26.5 KG/M2

## 2021-08-11 LAB
CHOLEST SERPL-MCNC: 126 MG/DL (ref 100–199)
EKG IMPRESSION: NORMAL
GLUCOSE BLD-MCNC: 139 MG/DL (ref 65–99)
HDLC SERPL-MCNC: 36 MG/DL
LDLC SERPL CALC-MCNC: 60 MG/DL
TRIGL SERPL-MCNC: 150 MG/DL (ref 0–149)

## 2021-08-11 PROCEDURE — A9270 NON-COVERED ITEM OR SERVICE: HCPCS | Performed by: INTERNAL MEDICINE

## 2021-08-11 PROCEDURE — 700102 HCHG RX REV CODE 250 W/ 637 OVERRIDE(OP): Performed by: INTERNAL MEDICINE

## 2021-08-11 PROCEDURE — 93010 ELECTROCARDIOGRAM REPORT: CPT | Performed by: INTERNAL MEDICINE

## 2021-08-11 PROCEDURE — 99217 PR OBSERVATION CARE DISCHARGE: CPT | Performed by: INTERNAL MEDICINE

## 2021-08-11 PROCEDURE — 80061 LIPID PANEL: CPT

## 2021-08-11 PROCEDURE — G0378 HOSPITAL OBSERVATION PER HR: HCPCS

## 2021-08-11 PROCEDURE — 99285 EMERGENCY DEPT VISIT HI MDM: CPT

## 2021-08-11 PROCEDURE — 82962 GLUCOSE BLOOD TEST: CPT

## 2021-08-11 RX ADMIN — GABAPENTIN 400 MG: 400 CAPSULE ORAL at 09:49

## 2021-08-11 RX ADMIN — LISINOPRIL 20 MG: 20 TABLET ORAL at 05:45

## 2021-08-11 RX ADMIN — TAMSULOSIN HYDROCHLORIDE 0.4 MG: 0.4 CAPSULE ORAL at 09:49

## 2021-08-11 RX ADMIN — CARVEDILOL 3.12 MG: 3.12 TABLET, FILM COATED ORAL at 09:49

## 2021-08-11 RX ADMIN — CETIRIZINE HYDROCHLORIDE 10 MG: 10 TABLET, FILM COATED ORAL at 05:43

## 2021-08-11 RX ADMIN — OMEPRAZOLE 20 MG: 20 CAPSULE, DELAYED RELEASE ORAL at 05:43

## 2021-08-11 RX ADMIN — ALLOPURINOL 300 MG: 300 TABLET ORAL at 05:43

## 2021-08-11 RX ADMIN — CLOPIDOGREL BISULFATE 75 MG: 75 TABLET ORAL at 05:43

## 2021-08-11 RX ADMIN — APIXABAN 5 MG: 5 TABLET, FILM COATED ORAL at 05:43

## 2021-08-11 ASSESSMENT — FIBROSIS 4 INDEX: FIB4 SCORE: 2.41

## 2021-08-11 NOTE — PROGRESS NOTES
Patient discharge education completed with all questions answered. Cardiac diet and exercise reinforced. Patient verbalized understanding with t/b 100%. Patient stable with no signs of distress or acute needs at this time. Patient with hosptial transport to lobby for discharge. Patient to main lobby with all belongings, except knife, which patient to pick-up at security in ER on way out. Patient aware.

## 2021-08-11 NOTE — DISCHARGE INSTRUCTIONS
Discharge Instructions    Discharged to home by car with relative. Discharged via walking, hospital escort: Yes.  Special equipment needed: Not Applicable    Be sure to schedule a follow-up appointment with your primary care doctor or any specialists as instructed.     Discharge Plan:        I understand that a diet low in cholesterol, fat, and sodium is recommended for good health. Unless I have been given specific instructions below for another diet, I accept this instruction as my diet prescription.   Other diet: Cardiac    Special Instructions: None    · Is patient discharged on Warfarin / Coumadin?   No     Depression / Suicide Risk    As you are discharged from this [facility] facility, it is important to learn how to keep safe from harming yourself.    Recognize the warning signs:  · Abrupt changes in personality, positive or negative- including increase in energy   · Giving away possessions  · Change in eating patterns- significant weight changes-  positive or negative  · Change in sleeping patterns- unable to sleep or sleeping all the time   · Unwillingness or inability to communicate  · Depression  · Unusual sadness, discouragement and loneliness  · Talk of wanting to die  · Neglect of personal appearance   · Rebelliousness- reckless behavior  · Withdrawal from people/activities they love  · Confusion- inability to concentrate     If you or a loved one observes any of these behaviors or has concerns about self-harm, here's what you can do:  · Talk about it- your feelings and reasons for harming yourself  · Remove any means that you might use to hurt yourself (examples: pills, rope, extension cords, firearm)  · Get professional help from the community (Mental Health, Substance Abuse, psychological counseling)  · Do not be alone:Call your Safe Contact- someone whom you trust who will be there for you.  · Call your local CRISIS HOTLINE 166-0732 or 294-755-7032  · Call your local Children's Mobile Crisis  Response Team [facility] (361) 542-9120 or www.Connequity  · Call the toll free National Suicide Prevention Hotlines   · National Suicide Prevention Lifeline 286-548-FYWY (9454)  · [facility] Line Network 800-SUICIDE (658-5665)    COVID-19  COVID-19 is a respiratory infection that is caused by a virus called severe acute respiratory syndrome coronavirus 2 (SARS-CoV-2). The disease is also known as coronavirus disease or novel coronavirus. In some people, the virus may not cause any symptoms. In others, it may cause a serious infection. The infection can get worse quickly and can lead to complications, such as:  · Pneumonia, or infection of the lungs.  · Acute respiratory distress syndrome or ARDS. This is fluid build-up in the lungs.  · Acute respiratory failure. This is a condition in which there is not enough oxygen passing from the lungs to the body.  · Sepsis or septic shock. This is a serious bodily reaction to an infection.  · Blood clotting problems.  · Secondary infections due to bacteria or fungus.  The virus that causes COVID-19 is contagious. This means that it can spread from person to person through droplets from coughs and sneezes (respiratory secretions).  What are the causes?  This illness is caused by a virus. You may catch the virus by:  · Breathing in droplets from an infected person's cough or sneeze.  · Touching something, like a table or a doorknob, that was exposed to the virus (contaminated) and then touching your mouth, nose, or eyes.  What increases the risk?  Risk for infection  You are more likely to be infected with this virus if you:  · Live in or travel to an area with a COVID-19 outbreak.  · Come in contact with a sick person who recently traveled to an area with a COVID-19 outbreak.  · Provide care for or live with a person who is infected with COVID-19.  Risk for serious illness  You are more likely to become seriously ill from the virus if you:  · Are 65 years of age or  older.  · Have a long-term disease that lowers your body's ability to fight infection (immunocompromised).  · Live in a nursing home or long-term care facility.  · Have a long-term (chronic) disease such as:  ? Chronic lung disease, including chronic obstructive pulmonary disease or asthma  ? Heart disease.  ? Diabetes.  ? Chronic kidney disease.  ? Liver disease.  · Are obese.  What are the signs or symptoms?  Symptoms of this condition can range from mild to severe. Symptoms may appear any time from 2 to 14 days after being exposed to the virus. They include:  · A fever.  · A cough.  · Difficulty breathing.  · Chills.  · Muscle pains.  · A sore throat.  · Loss of taste or smell.  Some people may also have stomach problems, such as nausea, vomiting, or diarrhea.  Other people may not have any symptoms of COVID-19.  How is this diagnosed?  This condition may be diagnosed based on:  · Your signs and symptoms, especially if:  ? You live in an area with a COVID-19 outbreak.  ? You recently traveled to or from an area where the virus is common.  ? You provide care for or live with a person who was diagnosed with COVID-19.  · A physical exam.  · Lab tests, which may include:  ? A nasal swab to take a sample of fluid from your nose.  ? A throat swab to take a sample of fluid from your throat.  ? A sample of mucus from your lungs (sputum).  ? Blood tests.  · Imaging tests, which may include, X-rays, CT scan, or ultrasound.  How is this treated?  At present, there is no medicine to treat COVID-19. Medicines that treat other diseases are being used on a trial basis to see if they are effective against COVID-19.  Your health care provider will talk with you about ways to treat your symptoms. For most people, the infection is mild and can be managed at home with rest, fluids, and over-the-counter medicines.  Treatment for a serious infection usually takes places in a hospital intensive care unit (ICU). It may include one or  more of the following treatments. These treatments are given until your symptoms improve.  · Receiving fluids and medicines through an IV.  · Supplemental oxygen. Extra oxygen is given through a tube in the nose, a face mask, or a rodarte.  · Positioning you to lie on your stomach (prone position). This makes it easier for oxygen to get into the lungs.  · Continuous positive airway pressure (CPAP) or bi-level positive airway pressure (BPAP) machine. This treatment uses mild air pressure to keep the airways open. A tube that is connected to a motor delivers oxygen to the body.  · Ventilator. This treatment moves air into and out of the lungs by using a tube that is placed in your windpipe.  · Tracheostomy. This is a procedure to create a hole in the neck so that a breathing tube can be inserted.  · Extracorporeal membrane oxygenation (ECMO). This procedure gives the lungs a chance to recover by taking over the functions of the heart and lungs. It supplies oxygen to the body and removes carbon dioxide.  Follow these instructions at home:  Lifestyle  · If you are sick, stay home except to get medical care. Your health care provider will tell you how long to stay home. Call your health care provider before you go for medical care.  · Rest at home as told by your health care provider.  · Do not use any products that contain nicotine or tobacco, such as cigarettes, e-cigarettes, and chewing tobacco. If you need help quitting, ask your health care provider.  · Return to your normal activities as told by your health care provider. Ask your health care provider what activities are safe for you.  General instructions  · Take over-the-counter and prescription medicines only as told by your health care provider.  · Drink enough fluid to keep your urine pale yellow.  · Keep all follow-up visits as told by your health care provider. This is important.  How is this prevented?    There is no vaccine to help prevent COVID-19 infection.  However, there are steps you can take to protect yourself and others from this virus.  To protect yourself:   · Do not travel to areas where COVID-19 is a risk. The areas where COVID-19 is reported change often. To identify high-risk areas and travel restrictions, check the [facility] travel website: wwwnc.cdc.gov/travel/notices  · If you live in, or must travel to, an area where COVID-19 is a risk, take precautions to avoid infection.  ? Stay away from people who are sick.  ? Wash your hands often with soap and water for 20 seconds. If soap and water are not available, use an alcohol-based hand .  ? Avoid touching your mouth, face, eyes, or nose.  ? Avoid going out in public, follow guidance from your state and local health authorities.  ? If you must go out in public, wear a cloth face covering or face mask.  ? Disinfect objects and surfaces that are frequently touched every day. This may include:  § Counters and tables.  § Doorknobs and light switches.  § Sinks and faucets.  § Electronics, such as phones, remote controls, keyboards, computers, and tablets.  To protect others:  If you have symptoms of COVID-19, take steps to prevent the virus from spreading to others.  · If you think you have a COVID-19 infection, contact your health care provider right away. Tell your health care team that you think you may have a COVID-19 infection.  · Stay home. Leave your house only to seek medical care. Do not use public transport.  · Do not travel while you are sick.  · Wash your hands often with soap and water for 20 seconds. If soap and water are not available, use alcohol-based hand .  · Stay away from other members of your household. Let healthy household members care for children and pets, if possible. If you have to care for children or pets, wash your hands often and wear a mask. If possible, stay in your own room, separate from others. Use a different bathroom.  · Make sure that all people in your  household wash their hands well and often.  · Cough or sneeze into a tissue or your sleeve or elbow. Do not cough or sneeze into your hand or into the air.  · Wear a cloth face covering or face mask.  Where to find more information  · Centers for Disease Control and Prevention: www.cdc.gov/coronavirus/2019-ncov/index.html  · World Health Organization: www.who.int/health-topics/coronavirus  Contact a health care provider if:  · You live in or have traveled to an area where COVID-19 is a risk and you have symptoms of the infection.  · You have had contact with someone who has COVID-19 and you have symptoms of the infection.  Get help right away if:  · You have trouble breathing.  · You have pain or pressure in your chest.  · You have confusion.  · You have bluish lips and fingernails.  · You have difficulty waking from sleep.  · You have symptoms that get worse.  These symptoms may represent a serious problem that is an emergency. Do not wait to see if the symptoms will go away. Get medical help right away. Call your local emergency services (911 in the [place] Do not drive yourself to the hospital. Let the emergency medical personnel know if you think you have COVID-19.  Summary  · COVID-19 is a respiratory infection that is caused by a virus. It is also known as coronavirus disease or novel coronavirus. It can cause serious infections, such as pneumonia, acute respiratory distress syndrome, acute respiratory failure, or sepsis.  · The virus that causes COVID-19 is contagious. This means that it can spread from person to person through droplets from coughs and sneezes.  · You are more likely to develop a serious illness if you are 65 years of age or older, have a weak immunity, live in a nursing home, or have chronic disease.  · There is no medicine to treat COVID-19. Your health care provider will talk with you about ways to treat your symptoms.  · Take steps to protect yourself and others from infection. Wash your  hands often and disinfect objects and surfaces that are frequently touched every day. Stay away from people who are sick and wear a mask if you are sick.  This information is not intended to replace advice given to you by your health care provider. Make sure you discuss any questions you have with your health care provider.  Document Released: 01/23/2020 Document Revised: 05/14/2020 Document Reviewed: 01/23/2020  Elsevier Patient Education © 2020 [employer]  Muscle Cramps and Spasms  Muscle cramps and spasms are when muscles tighten by themselves. They usually get better within minutes. Muscle cramps are painful. They are usually stronger and last longer than muscle spasms. Muscle spasms may or may not be painful. They can last a few seconds or much longer.  Cramps and spasms can affect any muscle, but they occur most often in the calf muscles of the leg. They are usually not caused by a serious problem. In many cases, the cause is not known. Some common causes include:  · Doing more physical work or exercise than your body is ready for.  · Using the muscles too much (overuse) by repeating certain movements too many times.  · Staying in a certain position for a long time.  · Playing a sport or doing an activity without preparing properly.  · Using bad form or technique while playing a sport or doing an activity.  · Not having enough water in your body (dehydration).  · Injury.  · Side effects of some medicines.  · Low levels of the salts and minerals in your blood (electrolytes), such as low potassium or calcium.  Follow these instructions at home:  Managing pain and stiffness         · Massage, stretch, and relax the muscle. Do this for many minutes at a time.  · If told, put heat on tight or tense muscles as often as told by your doctor. Use the heat source that your doctor recommends, such as a moist heat pack or a heating pad.  ? Place a towel between your skin and the heat source.  ? Leave the heat on for  20-30 minutes.  ? Remove the heat if your skin turns bright red. This is very important if you are not able to feel pain, heat, or cold. You may have a greater risk of getting burned.  · If told, put ice on the affected area. This may help if you are sore or have pain after a cramp or spasm.  ? Put ice in a plastic bag.  ? Place a towel between your skin and the bag.  ? Leave the ice on for 20 minutes, 2-3 times a day.  · Try taking hot showers or baths to help relax tight muscles.  Eating and drinking  · Drink enough fluid to keep your pee (urine) pale yellow.  · Eat a healthy diet to help ensure that your muscles work well. This should include:  ? Fruits and vegetables.  ? Lean protein.  ? Whole grains.  ? Low-fat or nonfat dairy products.  General instructions  · If you are having cramps often, avoid intense exercise for several days.  · Take over-the-counter and prescription medicines only as told by your doctor.  · Watch for any changes in your symptoms.  · Keep all follow-up visits as told by your doctor. This is important.  Contact a doctor if:  · Your cramps or spasms get worse or happen more often.  · Your cramps or spasms do not get better with time.  Summary  · Muscle cramps and spasms are when muscles tighten by themselves. They usually get better within minutes.  · Cramps and spasms occur most often in the calf muscles of the leg.  · Massage, stretch, and relax the muscle. This may help the cramp or spasm go away.  · Drink enough fluid to keep your pee (urine) pale yellow.  This information is not intended to replace advice given to you by your health care provider. Make sure you discuss any questions you have with your health care provider.  Document Released: 11/30/2009 Document Revised: 05/13/2019 Document Reviewed: 05/13/2019  [employer] Patient Education © 2020 [employer]  Angina    Angina is very bad discomfort or pain in the chest, neck, arm, jaw, or back. The discomfort is caused by a lack of  blood in the middle layer of the heart wall (myocardium).  What are the causes?  This condition is caused by a buildup of fat and cholesterol (plaque) in your arteries (atherosclerosis). This buildup narrows the arteries and makes it hard for blood to flow.  What increases the risk?  You are more likely to develop this condition if:  · You have high levels of cholesterol in your blood.  · You have high blood pressure (hypertension).  · You have diabetes.  · You have a family history of heart disease.  · You are not active, or you do not exercise enough.  · You feel sad (depressed).  · You have been treated with high energy rays (radiation) on the left side of your chest.  Other risk factors are:  · Using tobacco.  · Being very overweight (obese).  · Eating a diet high in unhealthy fats (saturated fats).  · Having stress, or being exposed to things that cause stress.  · Using drugs, such as cocaine.  Women have a greater risk for angina if:  · They are older than 55.  · They have stopped having their period (are in postmenopause).  What are the signs or symptoms?  Common symptoms of this condition in both men and women may include:  · Chest pain, which may:  ? Feel like a crushing or squeezing in the chest.  ? Feel like a tightness, pressure, fullness, or heaviness in the chest.  ? Last for more than a few minutes at a time.  ? Stop and come back (recur) after a few minutes.  · Pain in the neck, arm, jaw, or back.  · Heartburn or upset stomach (indigestion) for no reason.  · Being short of breath.  · Feeling sick to your stomach (nauseous).  · Sudden cold sweats.  Women and people with diabetes may have other symptoms that are not usual, such as feeling:  · Tired (fatigue).  · Worried or nervous (anxious) for no reason.  · Weak for no reason.  · Dizzy or passing out (fainting).  How is this treated?  This condition may be treated with:  · Medicines. These are given to:  ? Prevent blood clots.  ? Prevent heart  attack.  ? Relax blood vessels and improve blood flow to the heart (nitrates).  ? Reduce blood pressure.  ? Improve the pumping action of the heart.  ? Reduce fat and cholesterol in the blood.  · A procedure to widen a narrowed or blocked artery in the heart (angioplasty).  · Surgery to allow blood to go around a blocked artery (coronary artery bypass surgery).  Follow these instructions at home:  Medicines  · Take over-the-counter and prescription medicines only as told by your doctor.  · Do not take these medicines unless your doctor says that you can:  ? NSAIDs. These include:  § Ibuprofen.  § Naproxen.  ? Vitamin supplements that have vitamin A, vitamin E, or both.  ? Hormone therapy that contains estrogen with or without progestin.  Eating and drinking    · Eat a heart-healthy diet that includes:  ? Lots of fresh fruits and vegetables.  ? Whole grains.  ? Low-fat (lean) protein.  ? Low-fat dairy products.  · Follow instructions from your doctor about what you cannot eat or drink.  Activity  · Follow an exercise program that your doctor tells you.  · Talk with your doctor about joining a program to help improve the health of your heart (cardiac rehab).  · When you feel tired, take a break. Plan breaks if you know you are going to feel tired.  Lifestyle    · Do not use any products that contain nicotine or tobacco. This includes cigarettes, e-cigarettes, and chewing tobacco. If you need help quitting, ask your doctor.  · If your doctor says you can drink alcohol:  ? Limit how much you use to:  § 0-1 drink a day for women who are not pregnant.  § 0-2 drinks a day for men.  ? Be aware of how much alcohol is in your drink. In the [place] one drink equals:  § One 12 oz bottle of beer (355 mL).  § One 5 oz glass of wine (148 mL).  § One 1½ oz glass of hard liquor (44 mL).  General instructions  · Stay at a healthy weight. If your doctor tells you to do so, work with him or her to lose weight.  · Learn to deal with  stress. If you need help, ask your doctor.  · Keep your vaccines up to date. Get a flu shot every year.  · Talk with your doctor if you feel sad. Take a screening test to see if you are at risk for depression.  · Work with your doctor to manage any other health problems that you have. These may include diabetes or high blood pressure.  · Keep all follow-up visits as told by your doctor. This is important.  Get help right away if:  · You have pain in your chest, neck, arm, jaw, or back, and the pain:  ? Lasts more than a few minutes.  ? Comes back.  ? Does not get better after you take medicine under your tongue (sublingual nitroglycerin).  ? Keeps getting worse.  ? Comes more often.  · You have any of these problems for no reason:  ? Sweating a lot.  ? Heartburn or upset stomach.  ? Shortness of breath.  ? Trouble breathing.  ? Feeling sick to your stomach.  ? Throwing up (vomiting).  ? Feeling more tired than normal.  ? Feeling nervous or worrying more than normal.  ? Weakness.  · You are suddenly dizzy or light-headed.  · You pass out.  These symptoms may be an emergency. Do not wait to see if the symptoms will go away. Get medical help right away. Call your local emergency services (911 in the [place] Do not drive yourself to the hospital.  Summary  · Angina is very bad discomfort or pain in the chest, neck, arm, neck, or back.  · Symptoms include chest pain, heartburn or upset stomach for no reason, and shortness of breath.  · Women or people with diabetes may have symptoms that are not usual, such as feeling nervous or worried for no reason, weak for no reason, or tired.  · Take all medicines only as told by your doctor.  · You should eat a heart-healthy diet and follow an exercise program.  This information is not intended to replace advice given to you by your health care provider. Make sure you discuss any questions you have with your health care provider.  Document Released: 06/05/2009 Document Revised:  08/05/2019 Document Reviewed: 08/05/2019  Elsevier Patient Education © 2020 [employer]

## 2021-08-11 NOTE — DISCHARGE SUMMARY
Discharge Summary    CHIEF COMPLAINT ON ADMISSION  Chief Complaint   Patient presents with   • Chest Pain     rt sided x 3-4 days   • Shoulder Pain     left x 3-4 days       Reason for Admission  EMS     Admission Date  8/10/2021    CODE STATUS  Prior    HPI & HOSPITAL COURSE  This is a 81 y.o. male here with chest, shoulder and right jaw pain for 3-4 days and was concerned it might be cardiac in nature.  He has a significant cardiac history with 9 stents placed 7 years ago and follows with [facility] cardiology.  He has a stress test annually and has been doing quite well.  Echo was performed and shows EF 55%.  Troponin was checked serially and remained normal through hospitalization.  As he had a stress test within the last year, another one was not performed.  Chest pain has since resolved and only comes on with specific movements.  He is having musculoskeletal pain and tenderness palpated which re-created his chest and shoulder pain.  He is okay to discharge home and follow up with his cardiologist in the nest 2-4 weeks and states he already has a muscle relaxer at home.  I've instructed him to take the muscle relaxer tonight when he goes to bed and the pain he has been having should be markedly better when he wakes the next morning.       Therefore, he is discharged in good and stable condition to home with close outpatient follow-up.      Discharge Date  8/11/2021    FOLLOW UP ITEMS POST DISCHARGE  PCP, Cardiology    DISCHARGE DIAGNOSES  Principal Problem:    Other chest pain POA: Yes  Active Problems:    Essential hypertension, benign (Chronic) POA: Yes    Type 2 diabetes mellitus without complication, without long-term current use of insulin ([facility]) POA: Yes    GERD (gastroesophageal reflux disease) POA: Yes    S/P CABG x 2 POA: Yes    Neck pain POA: Unknown  Resolved Problems:    * No resolved hospital problems. *      FOLLOW UP  No future appointments.  Leland Hines Jr., M.D.  [address]  [id number] [phone]    In 2 days  Please call and schedule a hospital follow up appointment with your primary care provider as needed, Thank you.    Leland Hines Jr., M.D.  [address] [address] [phone]    In 1 month        MEDICATIONS ON DISCHARGE     Medication List      CONTINUE taking these medications      Instructions   allopurinol 300 MG Tabs  Commonly known as: ZYLOPRIM   Take 300 mg by mouth every day.  Dose: 300 mg     artificial tears Oint ophth ointment  Commonly known as: EYE LUBRICANT   Apply 1 Application to both eyes as needed (For dry eye).  Dose: 1 Application     atorvastatin 40 MG Tabs  Commonly known as: LIPITOR   Take 40 mg by mouth every evening.  Dose: 40 mg     B-12 1000 MCG Tabs   Take 1,000 mcg by mouth every day.  Dose: 1,000 mcg     carvedilol 3.125 MG Tabs  Commonly known as: COREG   Take 3.125 mg by mouth 2 times a day with meals.  Dose: 3.125 mg     cetirizine 10 MG Tabs  Commonly known as: ZYRTEC   Take 10 mg by mouth every day.  Dose: 10 mg     clopidogrel 75 MG Tabs  Commonly known as: PLAVIX   Take 1 Tab by mouth every day.  Dose: 75 mg     Eliquis 5mg Tabs  Generic drug: apixaban   Take 5 mg by mouth 2 times a day.  Dose: 5 mg     fenofibrate 160 MG tablet  Commonly known as: TRIGLIDE   Take 160 mg by mouth every evening.  Dose: 160 mg     gabapentin 400 MG Caps  Commonly known as: NEURONTIN   Take 400 mg by mouth 4 times a day.  Dose: 400 mg     GLUCOSAMINE CHOND COMPLEX/MSM PO   Take 2 Tabs by mouth every day.  Dose: 2 Tablet     ipratropium 0.03 % Soln  Commonly known as: ATROVENT   Administer 2 Sprays into affected nostril(S) every 6 hours as needed (For congestion).  Dose: 2 Spray     lidocaine 5 % Ptch  Commonly known as: LIDODERM   Place 1 Patch on the skin as needed (Apply's on back, shoulders, and neck).  Dose: 1 Patch     lisinopril 20 MG Tabs  Commonly known as: PRINIVIL   Take 20 mg by mouth every day.  Dose: 20 mg     Magnesium Oxide 420 MG  Tabs   Take 420 mg by mouth in the morning, at noon, and at bedtime.  Dose: 420 mg     metFORMIN 500 MG Tabs  Commonly known as: GLUCOPHAGE   Take 500 mg by mouth 2 times a day with meals.  Dose: 500 mg     MULTIVITAMIN PO   Take 1 Tab by mouth every day.  Dose: 1 Tablet     nitroglycerin 0.4 MG Subl  Commonly known as: NITROSTAT   Place 1 Tab under tongue as needed for Chest Pain (every 5 minutes up to 3 doses in 15 minutes.).  Dose: 0.4 mg     pantoprazole 40 MG Tbec  Commonly known as: Protonix   Take 1 Tab by mouth every day. Indications: Gastroesophageal Reflux Disease  Dose: 40 mg     predniSONE 20 MG Tabs  Commonly known as: DELTASONE   Take 40 mg by mouth every day. Pt started on 8/3/2021 for 5 day course  Dose: 40 mg     tamsulosin 0.4 MG capsule  Commonly known as: FLOMAX   Take 0.4 mg by mouth 1/2 hour after breakfast.  Dose: 0.4 mg     traMADol 50 MG Tabs  Commonly known as: ULTRAM   Take 50 mg by mouth every 6 hours as needed for Moderate Pain.  Dose: 50 mg            Allergies  Allergies   Allergen Reactions   • Penicillins      Pt reports that its a childhood allergie, not sure what happens        DIET  No orders of the defined types were placed in this encounter.      ACTIVITY  As tolerated.  Weight bearing as tolerated    CONSULTATIONS  none    PROCEDURES  none    LABORATORY  Lab Results   Component Value Date    SODIUM 133 (L) 08/10/2021    POTASSIUM 4.3 08/10/2021    CHLORIDE 98 08/10/2021    CO2 24 08/10/2021    GLUCOSE 172 (H) 08/10/2021    BUN 30 (H) 08/10/2021    CREATININE 1.07 08/10/2021        Lab Results   Component Value Date    WBC 7.7 08/10/2021    HEMOGLOBIN 13.2 (L) 08/10/2021    HEMATOCRIT 40.0 (L) 08/10/2021    PLATELETCT 156 (L) 08/10/2021        Total time of the discharge process exceeds 32 minutes.

## 2021-08-11 NOTE — PROGRESS NOTES
Report received from KAMRYN Goldberg. Plan of care discussed at patient's bedside. Whiteboard has been updated. Pt is resting comfortably in bed and declines any further needs at this time. Safety precautions in place and call light within reach.

## 2021-08-11 NOTE — DISCHARGE PLANNING
Anticipated Discharge Disposition:   Home when medically cleared     Action:   Chart review complete.     Per MD, this patient is medically cleared to discharge to home with no needs. RN CM will continue to follow and assist as needed. Patient's support going home is his spouse.     Barriers to Discharge:   None     Plan:   Hospital care management will continue to assist with discharge planning needs.

## 2021-08-11 NOTE — CARE PLAN
The patient is Stable - Low risk of patient condition declining or worsening    Shift Goals  Clinical Goals: Monitor for worsening s/s of cardiac distress  Patient Goals: Remain comfortable, rest    Progress made toward(s) clinical / shift goals:  Pt has had no increase in chest pain overnight. EKG has had no acute changes, and troponin levels have decreased since the initial draw in ED. Pt has been able to receive adequate rest with no acute events overnight.      Problem: Pain - Standard  Goal: Alleviation of pain or a reduction in pain to the patient’s comfort goal  Outcome: Progressing     Problem: Knowledge Deficit - Standard  Goal: Patient and family/care givers will demonstrate understanding of plan of care, disease process/condition, diagnostic tests and medications  Outcome: Progressing

## 2021-08-11 NOTE — PROGRESS NOTES
Telemetry Shift Summary      Rhythm: SR   HR Range: 69-88  Ectopy: R-O PVC, R-O PAC  Measurements: .20/.08/.36    Normal Values:  Rhythm: SR  HR Range:   Measurements: 0.12-0.20/ 0.06-0.10/ 0.30-0.52

## 2021-08-20 ENCOUNTER — HOSPITAL ENCOUNTER (OUTPATIENT)
Dept: HOSPITAL 8 - OUT | Age: 81
Discharge: HOME | End: 2021-08-20
Attending: STUDENT IN AN ORGANIZED HEALTH CARE EDUCATION/TRAINING PROGRAM
Payer: OTHER GOVERNMENT

## 2021-08-20 VITALS — WEIGHT: 160.5 LBS | BODY MASS INDEX: 25.79 KG/M2 | HEIGHT: 66 IN

## 2021-08-20 VITALS — DIASTOLIC BLOOD PRESSURE: 77 MMHG | SYSTOLIC BLOOD PRESSURE: 136 MMHG

## 2021-08-20 DIAGNOSIS — I42.9: ICD-10-CM

## 2021-08-20 DIAGNOSIS — E11.9: ICD-10-CM

## 2021-08-20 DIAGNOSIS — I25.2: ICD-10-CM
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
## 2021-08-20 DIAGNOSIS — S19.82XS: Primary | ICD-10-CM

## 2021-08-20 DIAGNOSIS — K80.20: ICD-10-CM

## 2021-08-20 DIAGNOSIS — M25.78: ICD-10-CM

## 2021-08-20 DIAGNOSIS — Z98.1: ICD-10-CM

## 2021-08-20 DIAGNOSIS — R51.9: ICD-10-CM

## 2021-08-20 DIAGNOSIS — Z88.0: ICD-10-CM

## 2021-08-20 DIAGNOSIS — Z88.8: ICD-10-CM

## 2021-08-20 DIAGNOSIS — M48.02: ICD-10-CM

## 2021-08-20 DIAGNOSIS — I10: ICD-10-CM

## 2021-08-20 DIAGNOSIS — Z87.891: ICD-10-CM

## 2021-08-20 DIAGNOSIS — R20.0: ICD-10-CM

## 2021-08-20 PROCEDURE — 99157 MOD SED OTHER PHYS/QHP EA: CPT

## 2021-08-20 PROCEDURE — 99156 MOD SED OTH PHYS/QHP 5/>YRS: CPT

## 2021-08-20 PROCEDURE — 71250 CT THORAX DX C-: CPT

## 2021-08-20 PROCEDURE — 72141 MRI NECK SPINE W/O DYE: CPT
